# Patient Record
Sex: FEMALE | Race: WHITE | NOT HISPANIC OR LATINO | URBAN - METROPOLITAN AREA
[De-identification: names, ages, dates, MRNs, and addresses within clinical notes are randomized per-mention and may not be internally consistent; named-entity substitution may affect disease eponyms.]

---

## 2017-07-07 ENCOUNTER — OFFICE (AMBULATORY)
Dept: URBAN - METROPOLITAN AREA CLINIC 64 | Facility: CLINIC | Age: 57
End: 2017-07-07

## 2017-07-07 ENCOUNTER — ON CAMPUS - OUTPATIENT (AMBULATORY)
Dept: URBAN - METROPOLITAN AREA HOSPITAL 2 | Facility: HOSPITAL | Age: 57
End: 2017-07-07
Payer: COMMERCIAL

## 2017-07-07 VITALS
HEART RATE: 60 BPM | HEIGHT: 64 IN | SYSTOLIC BLOOD PRESSURE: 160 MMHG | SYSTOLIC BLOOD PRESSURE: 179 MMHG | HEART RATE: 64 BPM | SYSTOLIC BLOOD PRESSURE: 127 MMHG | DIASTOLIC BLOOD PRESSURE: 135 MMHG | OXYGEN SATURATION: 99 % | RESPIRATION RATE: 16 BRPM | TEMPERATURE: 97.1 F | DIASTOLIC BLOOD PRESSURE: 67 MMHG | SYSTOLIC BLOOD PRESSURE: 121 MMHG | OXYGEN SATURATION: 94 % | DIASTOLIC BLOOD PRESSURE: 61 MMHG | SYSTOLIC BLOOD PRESSURE: 163 MMHG | SYSTOLIC BLOOD PRESSURE: 141 MMHG | HEART RATE: 63 BPM | HEART RATE: 65 BPM | SYSTOLIC BLOOD PRESSURE: 107 MMHG | DIASTOLIC BLOOD PRESSURE: 73 MMHG | HEART RATE: 79 BPM | HEART RATE: 85 BPM | OXYGEN SATURATION: 100 % | SYSTOLIC BLOOD PRESSURE: 154 MMHG | WEIGHT: 185 LBS | DIASTOLIC BLOOD PRESSURE: 80 MMHG | RESPIRATION RATE: 18 BRPM | DIASTOLIC BLOOD PRESSURE: 64 MMHG | SYSTOLIC BLOOD PRESSURE: 140 MMHG | SYSTOLIC BLOOD PRESSURE: 131 MMHG | HEART RATE: 70 BPM | OXYGEN SATURATION: 95 % | HEART RATE: 72 BPM | DIASTOLIC BLOOD PRESSURE: 63 MMHG | OXYGEN SATURATION: 97 % | DIASTOLIC BLOOD PRESSURE: 92 MMHG | HEART RATE: 66 BPM | DIASTOLIC BLOOD PRESSURE: 101 MMHG | DIASTOLIC BLOOD PRESSURE: 86 MMHG

## 2017-07-07 DIAGNOSIS — D12.3 BENIGN NEOPLASM OF TRANSVERSE COLON: ICD-10-CM

## 2017-07-07 DIAGNOSIS — Z86.010 PERSONAL HISTORY OF COLONIC POLYPS: ICD-10-CM

## 2017-07-07 DIAGNOSIS — K21.9 GASTRO-ESOPHAGEAL REFLUX DISEASE WITHOUT ESOPHAGITIS: ICD-10-CM

## 2017-07-07 DIAGNOSIS — R13.10 DYSPHAGIA, UNSPECIFIED: ICD-10-CM

## 2017-07-07 DIAGNOSIS — K57.30 DIVERTICULOSIS OF LARGE INTESTINE WITHOUT PERFORATION OR ABS: ICD-10-CM

## 2017-07-07 LAB
GI HISTOLOGY: A. UNSPECIFIED: (no result)
GI HISTOLOGY: PDF REPORT: (no result)

## 2017-07-07 PROCEDURE — 88305 TISSUE EXAM BY PATHOLOGIST: CPT | Mod: 26 | Performed by: INTERNAL MEDICINE

## 2017-07-07 PROCEDURE — 43450 DILATE ESOPHAGUS 1/MULT PASS: CPT | Performed by: INTERNAL MEDICINE

## 2017-07-07 PROCEDURE — 45385 COLONOSCOPY W/LESION REMOVAL: CPT | Performed by: INTERNAL MEDICINE

## 2017-07-07 PROCEDURE — 43235 EGD DIAGNOSTIC BRUSH WASH: CPT | Performed by: INTERNAL MEDICINE

## 2017-07-07 RX ORDER — PANTOPRAZOLE SODIUM 40 MG/1
80 TABLET, DELAYED RELEASE ORAL
Qty: 60 | Refills: 11 | Status: COMPLETED
Start: 2017-07-07 | End: 2019-03-12

## 2017-07-07 RX ADMIN — PROPOFOL: 10 INJECTION, EMULSION INTRAVENOUS at 15:03

## 2017-08-22 ENCOUNTER — OFFICE (AMBULATORY)
Dept: URBAN - NONMETROPOLITAN AREA CLINIC 10 | Facility: CLINIC | Age: 57
End: 2017-08-22

## 2017-08-22 VITALS
HEIGHT: 64 IN | SYSTOLIC BLOOD PRESSURE: 142 MMHG | WEIGHT: 177 LBS | HEART RATE: 78 BPM | DIASTOLIC BLOOD PRESSURE: 84 MMHG

## 2017-08-22 DIAGNOSIS — R11.2 NAUSEA WITH VOMITING, UNSPECIFIED: ICD-10-CM

## 2017-08-22 DIAGNOSIS — R13.10 DYSPHAGIA, UNSPECIFIED: ICD-10-CM

## 2017-08-22 DIAGNOSIS — K21.9 GASTRO-ESOPHAGEAL REFLUX DISEASE WITHOUT ESOPHAGITIS: ICD-10-CM

## 2017-08-22 PROCEDURE — 99213 OFFICE O/P EST LOW 20 MIN: CPT | Performed by: INTERNAL MEDICINE

## 2017-10-25 ENCOUNTER — HOSPITAL ENCOUNTER (OUTPATIENT)
Dept: SLEEP MEDICINE | Facility: HOSPITAL | Age: 57
Discharge: HOME | End: 2017-10-25
Attending: INTERNAL MEDICINE | Admitting: INTERNAL MEDICINE

## 2017-12-28 ENCOUNTER — HOSPITAL ENCOUNTER (OUTPATIENT)
Dept: SLEEP MEDICINE | Facility: HOSPITAL | Age: 57
Discharge: HOME | End: 2017-12-28
Attending: INTERNAL MEDICINE | Admitting: INTERNAL MEDICINE

## 2018-04-24 ENCOUNTER — OFFICE (AMBULATORY)
Dept: URBAN - NONMETROPOLITAN AREA CLINIC 10 | Facility: CLINIC | Age: 58
End: 2018-04-24

## 2018-04-24 VITALS
DIASTOLIC BLOOD PRESSURE: 86 MMHG | HEART RATE: 73 BPM | HEIGHT: 64 IN | SYSTOLIC BLOOD PRESSURE: 150 MMHG | WEIGHT: 195 LBS

## 2018-04-24 DIAGNOSIS — K21.9 GASTRO-ESOPHAGEAL REFLUX DISEASE WITHOUT ESOPHAGITIS: ICD-10-CM

## 2018-04-24 DIAGNOSIS — R11.0 NAUSEA: ICD-10-CM

## 2018-04-24 DIAGNOSIS — R10.31 RIGHT LOWER QUADRANT PAIN: ICD-10-CM

## 2018-04-24 DIAGNOSIS — K62.5 HEMORRHAGE OF ANUS AND RECTUM: ICD-10-CM

## 2018-04-24 PROCEDURE — 99213 OFFICE O/P EST LOW 20 MIN: CPT | Performed by: INTERNAL MEDICINE

## 2018-04-24 RX ORDER — OMEPRAZOLE 40 MG/1
40 CAPSULE, DELAYED RELEASE ORAL
Qty: 90 | Refills: 4 | Status: COMPLETED
Start: 2018-04-24 | End: 2019-03-12

## 2019-03-12 ENCOUNTER — OFFICE (AMBULATORY)
Dept: URBAN - NONMETROPOLITAN AREA CLINIC 10 | Facility: CLINIC | Age: 59
End: 2019-03-12

## 2019-03-12 VITALS
HEIGHT: 64 IN | HEART RATE: 78 BPM | SYSTOLIC BLOOD PRESSURE: 140 MMHG | WEIGHT: 204 LBS | DIASTOLIC BLOOD PRESSURE: 80 MMHG

## 2019-03-12 DIAGNOSIS — K62.5 HEMORRHAGE OF ANUS AND RECTUM: ICD-10-CM

## 2019-03-12 PROCEDURE — 99214 OFFICE O/P EST MOD 30 MIN: CPT | Performed by: INTERNAL MEDICINE

## 2019-03-19 ENCOUNTER — HOSPITAL ENCOUNTER (OUTPATIENT)
Dept: FAMILY MEDICINE CLINIC | Facility: CLINIC | Age: 59
Setting detail: SPECIMEN
Discharge: HOME | End: 2019-03-19
Attending: FAMILY MEDICINE | Admitting: FAMILY MEDICINE

## 2019-03-19 LAB
BASOPHILS # BLD AUTO: 0 10*3/UL (ref 0–0.2)
BASOPHILS NFR BLD AUTO: 1 % (ref 0–2)
DIFFERENTIAL METHOD BLD: (no result)
EOSINOPHIL # BLD AUTO: 0.4 10*3/UL (ref 0–0.3)
EOSINOPHIL # BLD AUTO: 9 % (ref 0–3)
ERYTHROCYTE [DISTWIDTH] IN BLOOD BY AUTOMATED COUNT: 13.7 % (ref 11.5–14.5)
HCT VFR BLD AUTO: 38.8 % (ref 35–49)
HGB BLD-MCNC: 12.7 G/DL (ref 12–15)
LYMPHOCYTES # BLD AUTO: 1.5 10*3/UL (ref 0.8–4.8)
LYMPHOCYTES NFR BLD AUTO: 34 % (ref 18–42)
MCH RBC QN AUTO: 30 PG (ref 26–32)
MCHC RBC AUTO-ENTMCNC: 32.7 G/DL (ref 32–36)
MCV RBC AUTO: 91.7 FL (ref 80–94)
MONOCYTES # BLD AUTO: 0.3 10*3/UL (ref 0.1–1.3)
MONOCYTES NFR BLD AUTO: 8 % (ref 2–11)
NEUTROPHILS # BLD AUTO: 2.2 10*3/UL (ref 2.3–8.6)
NEUTROPHILS NFR BLD AUTO: 48 % (ref 50–75)
NRBC BLD AUTO-RTO: 0 /100{WBCS}
NRBC/RBC NFR BLD MANUAL: 0 10*3/UL
PLATELET # BLD AUTO: 259 10*3/UL (ref 150–450)
PMV BLD AUTO: 7.7 FL (ref 7.4–10.4)
RBC # BLD AUTO: 4.23 10*6/UL (ref 4–5.4)
WBC # BLD AUTO: 4.5 10*3/UL (ref 4.5–11.5)

## 2019-04-24 ENCOUNTER — CONVERSION ENCOUNTER (OUTPATIENT)
Dept: OTHER | Facility: HOSPITAL | Age: 59
End: 2019-04-24

## 2019-05-28 ENCOUNTER — CONVERSION ENCOUNTER (OUTPATIENT)
Dept: OTHER | Facility: HOSPITAL | Age: 59
End: 2019-05-28

## 2019-06-04 VITALS
WEIGHT: 219.6 LBS | HEIGHT: 55 IN | WEIGHT: 223.4 LBS | OXYGEN SATURATION: 95 % | DIASTOLIC BLOOD PRESSURE: 85 MMHG | DIASTOLIC BLOOD PRESSURE: 90 MMHG | SYSTOLIC BLOOD PRESSURE: 156 MMHG | HEIGHT: 55 IN | BODY MASS INDEX: 50.82 KG/M2 | OXYGEN SATURATION: 95 % | HEART RATE: 75 BPM | BODY MASS INDEX: 51.7 KG/M2 | SYSTOLIC BLOOD PRESSURE: 144 MMHG

## 2019-06-06 NOTE — PROGRESS NOTES
Visit Type:  Follow-up Visit  Referring Provider:  Rosy Martinez MD   Primary Provider:  Rosy Martinez MD     CC:  1 month follow up.    History of Present Illness:  Patient presents for a 1 month follow up.  she has multiple issues  Fu anxiety, doing well with current med    Hx DDD spine with spinal Stenosis, scoliosis. meningioma.  She has brought in previous imaging done by Dr. Robins([facility]) and Dr. Zhang ( spine) [facility]  She was being considered for a very complicated surgery to address these problems.  Imaging scanned and reviewed.  will request office notes from these providers She isn't sure that she is ready to go through surgery.  She is requesting hospital bed amd a new lift chair.  will get office notes from consultatnts  Hx +RF.  Pt has pending appointment with rheumatology  Hx Sarcoidodisis-she needs referral to pulmonorlogy.  she is requesting evaluation for nocturnal hypoxemia and has not yet heard from [facility]    She has taken alendronate for over 6 years and her latest Dexa shows normal bone density.  Alendronate will be Dc'd    She is follwed by urology for  bladder problems    She needs fu with GI for polyps, fissures, and GYN forp elvic prolapse          Vital Signs:    Patient Profile:    58 Years Old Female  Height:     53 inches (134.62 cm)  Weight:     223.4 pounds  BMI:        55.91     O2 Sat:     95 %  Temp:       100.4 degrees F oral  Pulse rate: 879 / minute  BP Sittin / 90  (left arm)    Cuff size:  regular      Problems: Active problems were reviewed with the patient during this visit.  Medications: Medications were reviewed with the patient during this visit.  Allergies: Allergies were reviewed with the patient during this visit.        Vitals Entered By: Olivia Fink [facility] (May 28, 2019 3:18 PM)    Active Medications (reviewed today):  VINCE-BEE/C ORAL TABLET (B COMPLEX-C-FOLIC ACID) 1 po qd  BUSPIRONE HCL 10 MG ORAL TABLET (BUSPIRONE HCL) 1  po BID PRN anxiety  FLONASE ALLERGY RELIEF 50 MCG/ACT NASAL SUSPENSION (FLUTICASONE PROPIONATE) 2 sprays once daily  CYMBALTA 60 MG ORAL CAPSULE DELAYED RELEASE PARTICLES (DULOXETINE HCL) 1 po every other day (OPPOSITE DAYS OF VIIBRYD)  AMITRIPTYLINE HCL 75 MG ORAL TABLET (AMITRIPTYLINE HCL) take 1 tablet at bedtime  LEXAPRO 20 MG ORAL TABLET (ESCITALOPRAM OXALATE) 1/2 tablet  CARAFATE 1 GM ORAL TABLET (SUCRALFATE) tale 1 tablet 3x a day  HYDROXYZ HCL TAB 25MG 25.000 TABLET (HYDROXYZINE HCL) TAKE ONE (1) TABLET BY MOUTH THREE TIMES DAILY  METOPROLOL SUCCINATE ER 50 MG ORAL TABLET EXTENDED RELEASE 24 HOUR (METOPROLOL SUCCINATE) 1 po qd  IPRATROPIUM-ALBUTEROL 0.5-2.5 (3) MG/3ML INHALATION SOLUTION (IPRATROPIUM-ALBUTEROL) 1 vial QID  PROMETHAZINE 25MG 25.000 TABLET (PROMETHAZINE HCL) TAKE 1 TABLET 2 TIMES A DAY ROUTINELY  DOXEPIN HCL  CAP 25MG CAPSULE (DOXEPIN HCL) TAKE 1 CAPSULE BY MOUTH AT BEDTIME  OXYCODONE-ACETAMINOPHEN  MG ORAL TABLET (OXYCODONE-ACETAMINOPHEN) 1 tab po five times daily routinely  BACLOFEN     TAB 20MG TABLET (BACLOFEN) TAKE 1 TABLET 4 TIMES A DAY ROUTINELY  TAMSULOSIN HCL 0.4 MG ORAL CAPSULE (TAMSULOSIN HCL) 1 po qd  MONTELUKAST SODIUM 10 MG ORAL TABLET (MONTELUKAST SODIUM) 1 po qd  GABAPENTIN 800 MG ORAL TABLET (GABAPENTIN) 1 tab po QID    Current Allergies (reviewed today):  NSAIDS (Critical)  AUGMENTIN (AMOXICILLIN-POT CLAVULANATE) (Critical)  * CLINDAMYCIN (Critical)  CODEINE (Critical)  CORTISONE (MORPHINE SULFATE) (Critical)  * DEPOMEDROL (Critical)  ERYTHROMYCIN (Critical)  FELDENE (PIROXICAM) (Critical)  * METHADONE (Critical)  * NORGESIC (Critical)  PCN (PENICILLIN V POTASSIUM) (Critical)  PERCODAN (Critical)  PREDNISONE (PREDNISONE) (Critical)  SULFA (Critical)  TAGAMET (CIMETIDINE) (Critical)  * VIBRA-TAB (Critical)  VICODIN (Critical)  * ZYRTEC (Critical)  ADHESIVE TAPE (Critical)      Past Medical History:     Migraines     Hypertension     Asthma     COPD, sarcpoidosis of  Lung     Borderline Diabetes      Kidney Stone     Pre-Cancerous Cells in Colon & Bladder , colon polyps     3 cysts in thryoid       ?? Rheumatoid Arthritis/sarcoidosis ( per old provider- Dr. Batista)     Brain Tumor-meningioma     Breast Tumors     Lumbar Spondylosis with myelopathy     Spinal stenosis of lumbar region, DDD-cervical, thjoracic, lumbar     elevated diaphragm     rotated spine, scoliosis     FRANKIE     Glaucoma      Gangrene      Rectocele     Cystocele     +RF     Colon polyp          Risk Factors:     Smoked Tobacco Use:  Never smoker  Smokeless Tobacco Use:  Never  Passive smoke exposure:  no  Drug use:  no  HIV high-risk behavior:  no  Caffeine use:  0 drinks per day  Alcohol use:  no  Exercise:  no  Seatbelt use:  100 %  Sun Exposure:  rarely    Dietary Counseling: yes    Previous Tobacco Use: Signed On - 04/03/2019  Smoked Tobacco Use:  Never smoker  Smokeless Tobacco Use:  Never  Passive smoke exposure:  no  Drug use:  no  HIV high-risk behavior:  no  Caffeine use:  0 drinks per day    Previous Alcohol Use: Signed On - 04/03/2019  Alcohol use:  no  Exercise:  no  Seatbelt use:  100 %  Sun Exposure:  rarely    Dietary Counseling: yes        Review of Systems        See HPI      Physical Exam    General:      obese.    Head:      normocephalic and atraumatic.    Eyes:      PERRL/EOM intact, conjunctiva and sclera clear with out nystagmus.    Ears:      TM's intact and clear with normal canals with grossly normal hearing.    Nose:      no deformity, discharge, inflammation, or lesions.    Mouth:      no deformity or lesions with good dentition.    Abdomen:       normal bowel sounds; no hepatosplenomegaly no ventral,umbilical hernias or masses noted.    Msk:      decreased ROM, scoliosis, kyphosis and unsteady gait.    Pulses:      pulses normal in all 4 extremities.    Extremities:      trace left pedal edema and trace right pedal edema.    Neurologic:      CN II-XII gross intact and weakness  noted:.      Diabetes Management Exam:      Foot Exam (with socks and/or shoes not present):        Pulses:           pulses normal in all 4 extremities.        Blood Pressure:  Today's BP: 144/90 mm Hg          Impression & Recommendations:    Problem # 1:  DDD, axial skeleton (ICD-722.6) (YRV82-R27.9)  Assessment: Unchanged    Problem # 2:  Brain Tumor (ICD-191.9) (QBM09-L43.9)  Assessment: Unchanged    Problem # 3:  Sarcoidosis, pulmonary (ICD-135) (MHA37-G45.0)  Assessment: Unchanged    Problem # 4:  Anxiety disorder (ICD-300.00) (IYE47-K94.9)  Assessment: Improved    Her updated medication list for this problem includes:     Buspirone Hcl 10 Mg Oral Tablet (Buspirone hcl) ..... 1 po bid prn anxiety     Cymbalta 60 Mg Oral Capsule Delayed Release Particles (Duloxetine hcl) ..... 1 po every other day (opposite days of viibryd)     Amitriptyline Hcl 75 Mg Oral Tablet (Amitriptyline hcl) ..... Take 1 tablet at bedtime     Lexapro 20 Mg Oral Tablet (Escitalopram oxalate) ..... 1 tablet daily     Hydroxyz Hcl Tab 25mg 25.000 Tablet (Hydroxyzine hcl) ..... Take one (1) tablet by mouth three times daily     Doxepin Hcl Cap 25mg Capsule (Doxepin hcl) ..... Take 1 capsule by mouth at bedtime      Problem # 5:  Scoliosis deformity of spine (ICD-737.30) (ZTC63-K08.9)  Assessment: Unchanged    Problem # 6:  Hypoxemia (ICD-799.02) (JDU32-F06.02)  Assessment: Unchanged    Problem # 7:  Functional gait abnormality (ICD-781.2) (BCV59-Q41.9)  Assessment: Unchanged    Problem # 8:  Pedal edema (ICD-782.3) (TYW02-Q44.0)    Medications Added to Medication List This Visit:  1)  Furosemide 20 Mg Oral Tablet (Furosemide) .... Take 1 tablet daily as needed for edema        Patient Instructions:  1)  fu [facility] referral for nocturnal oximetry  2)  refer to pulmonology for Sarcoidosis  3)  keep fu with dpine surgery, neurosurgery, rheumatology  4)  refer to [facility] for lift chair and hospital bed  5)  Rx lasix for edemaa  6)  refill meds  as needed  7)  keep fu of abnormal mammogram    ]      Electronically signed by Roys Martinez MD on 05/29/2019 at 11:40 AM  ________________________________________________________________________       Disclaimer: Converted Note message may not contain all data elements that existed in the legacy source system. Please see [employer] [facility] for the original note details.

## 2019-06-06 NOTE — PROGRESS NOTES
Visit Type:  New Problem  Referring Provider:  Rosy Martinez MD   Primary Provider:  Roys Martinez MD     CC:  left ear/antidepressant.    History of Present Illness:  Patient presents for a follow up visit.   Fu anxiety  She was switched to Viibryd which was too expensive and will be resuming lexapro aalternate with cymbalta   She reports decreased hearing in her left ear.    Patient has not heard anything from [facility] about her home oxygen yet..  she has increased soa at night  and needs nocturnal oximetry      Vital Signs:    Patient Profile:    58 Years Old Female  Height:     53 inches (134.62 cm)  Weight:     219.6 pounds  BMI:        54.96     O2 Sat:     95 %  Temp:       99.1 degrees F oral  Pulse rate: 75 / minute  BP Sittin / 85  (left arm)    Cuff size:  regular      Problems: Active problems were reviewed with the patient during this visit.  Medications: Medications were reviewed with the patient during this visit.  Allergies: Allergies were reviewed with the patient during this visit.        Vitals Entered By: Olivia Fink [facility] (2019 1:57 PM)    Active Medications (reviewed today):  AMITRIPTYLINE HCL 75 MG ORAL TABLET (AMITRIPTYLINE HCL) take 1 tablet at bedtime  LEXAPRO 20 MG ORAL TABLET (ESCITALOPRAM OXALATE) 1/2 tablet  CARAFATE 1 GM ORAL TABLET (SUCRALFATE) tale 1 tablet 3x a day  HYDROXYZ HCL TAB 25MG 25.000 TABLET (HYDROXYZINE HCL) TAKE ONE (1) TABLET BY MOUTH THREE TIMES DAILY  METOPROLOL SUCCINATE ER 50 MG ORAL TABLET EXTENDED RELEASE 24 HOUR (METOPROLOL SUCCINATE) 1 po qd  IPRATROPIUM-ALBUTEROL 0.5-2.5 (3) MG/3ML INHALATION SOLUTION (IPRATROPIUM-ALBUTEROL) 1 vial QID  PROMETHAZINE 25MG 25.000 TABLET (PROMETHAZINE HCL) TAKE 1 TABLET 2 TIMES A DAY ROUTINELY  DOXEPIN HCL  CAP 25MG CAPSULE (DOXEPIN HCL) TAKE 1 CAPSULE BY MOUTH AT BEDTIME  OXYCODONE-ACETAMINOPHEN  MG ORAL TABLET (OXYCODONE-ACETAMINOPHEN) 1 tab po five times daily routinely  BACLOFEN     TAB 20MG  TABLET (BACLOFEN) TAKE 1 TABLET 4 TIMES A DAY ROUTINELY  TAMSULOSIN HCL 0.4 MG ORAL CAPSULE (TAMSULOSIN HCL) 1 po qd  MONTELUKAST SODIUM 10 MG ORAL TABLET (MONTELUKAST SODIUM) 1 po qd  GABAPENTIN 800 MG ORAL TABLET (GABAPENTIN) 1 tab po QID    Current Allergies (reviewed today):  NSAIDS (Critical)  AUGMENTIN (AMOXICILLIN-POT CLAVULANATE) (Critical)  * CLINDAMYCIN (Critical)  CODEINE (Critical)  CORTISONE (MORPHINE SULFATE) (Critical)  * DEPOMEDROL (Critical)  ERYTHROMYCIN (Critical)  FELDENE (PIROXICAM) (Critical)  * METHADONE (Critical)  * NORGESIC (Critical)  PCN (PENICILLIN V POTASSIUM) (Critical)  PERCODAN (Critical)  PREDNISONE (PREDNISONE) (Critical)  SULFA (Critical)  TAGAMET (CIMETIDINE) (Critical)  * VIBRA-TAB (Critical)  VICODIN (Critical)  * ZYRTEC (Critical)  ADHESIVE TAPE (Critical)      Past Medical History:     Reviewed history from 04/03/2019 and no changes required:        Migraines        Hypertension        Asthma        COPD        Borderline Diabetes         Kidney Stone        Pre-Cancerous Cells in Colon & Bladder         3 cysts in thryoid          ?? Rheumatoid Arthritis/sarcoidosis ( per old provider- Dr. Batista)        Brain Tumor        Breast Tumors        Lumbar Spondylosis with myelopathy        Spinal stenosis of lumbar region        elevated diaphragm        rotated spine        Lumbar foraminal stenosis         Glaucoma         Gangrene         Rectocele        Cystocele    Past Surgical History:     Reviewed history from 02/06/2019 and no changes required:        Appendectomy        Cholecystectomy        Carpal Tunnel Release- (R)         Breast cysts removed         Partial Hysterectomy         Social History:     Reviewed history from 03/19/2019 and no changes required:        Passive Smoke: NAlcohol Use: N        Patient refuses blood transfusions.                Smoking History:        Patient has never smoked.        Risk Factors:     Smoked Tobacco Use:  Never  smoker  Smokeless Tobacco Use:  Never  Passive smoke exposure:  no  Drug use:  no  HIV high-risk behavior:  no  Caffeine use:  0 drinks per day  Alcohol use:  no  Exercise:  no  Seatbelt use:  100 %  Sun Exposure:  rarely    Dietary Counseling: yes    Previous Tobacco Use: Signed On - 04/03/2019  Smoked Tobacco Use:  Never smoker  Smokeless Tobacco Use:  Never  Passive smoke exposure:  no  Drug use:  no  HIV high-risk behavior:  no  Caffeine use:  0 drinks per day    Previous Alcohol Use: Signed On - 04/03/2019  Alcohol use:  no  Exercise:  no  Seatbelt use:  100 %  Sun Exposure:  rarely    Dietary Counseling: yes          Physical Exam    General:      obese.    Head:      normocephalic and atraumatic.    Eyes:      PERRL/EOM intact, conjunctiva and sclera clear with out nystagmus.    Ears:      L TM dull.    Nose:      no deformity, discharge, inflammation, or lesions.    Mouth:      no deformity or lesions with good dentition.    Neck:      no masses, thyromegaly, or abnormal cervical nodes.    Lungs:      decreased BS on L and decreased BS on R.    Heart:      non-displaced PMI, chest non-tender; regular rate and rhythm, S1, S2 without murmurs, rubs, or gallops  Msk:      decreased ROM, scoliosis, kyphosis and unsteady gait.    Neurologic:      weakness noted:.    Psych:      anxious.        Blood Pressure:  Today's BP: 156/85 mm Hg          Impression & Recommendations:    Problem # 1:  Anxiety disorder (ICD-300.00) (YTF07-V42.9)  Assessment: Unchanged    Her updated medication list for this problem includes:     Buspirone Hcl 10 Mg Oral Tablet (Buspirone hcl) ..... 1 po bid prn anxiety     Cymbalta 60 Mg Oral Capsule Delayed Release Particles (Duloxetine hcl) ..... 1 po every other day (opposite days of viibryd)     Amitriptyline Hcl 75 Mg Oral Tablet (Amitriptyline hcl) ..... Take 1 tablet at bedtime     Lexapro 20 Mg Oral Tablet (Escitalopram oxalate) ..... 1 tablet daily     Hydroxyz Hcl Tab 25mg 25.000  Tablet (Hydroxyzine hcl) ..... Take one (1) tablet by mouth three times daily     Doxepin Hcl Cap 25mg Capsule (Doxepin hcl) ..... Take 1 capsule by mouth at bedtime      Problem # 2:  Ear pain, left (ICD-388.70) (PTH60-K49.02)  Assessment: Unchanged    Problem # 3:  DDD, axial skeleton (ICD-722.6) (BKC31-B13.9)  Assessment: Unchanged    Problem # 4:  Functional gait abnormality (ICD-781.2) (GXU96-D36.9)  Assessment: Unchanged    Medications Added to Medication List This Visit:  1)  Herlinda-bee/c Oral Tablet (B complex-c-folic acid) .... 1 po qd  2)  Buspirone Hcl 10 Mg Oral Tablet (Buspirone hcl) .... 1 po bid prn anxiety  3)  Flonase Allergy Relief 50 Mcg/act Nasal Suspension (Fluticasone propionate) .... 2 sprays once daily  4)  Lexapro 20 Mg Oral Tablet (Escitalopram oxalate) .... 1 tablet daily    Medications:  HERLINDA-BEE/C ORAL TABLET (B COMPLEX-C-FOLIC ACID) 1 po qd  #90[Tablet] x 0      Entered by: Shaunna Bobo LPN      Authorized by:  Rosy Martinez MD      Electronically signed by:   Shaunna Bobo LPN on 05/24/2019      Method used:    Electronically to               [facility]* (retail)              [place] [address]              Ph: [phone]              Fax: [phone]      Note to Pharmacy: Route: ORAL;       RxID:   [id number]  PROMETHAZINE 25MG 25.000 TABLET (PROMETHAZINE HCL) TAKE 1 TABLET 2 TIMES A DAY ROUTINELY  #180[Tablet] x 0      Entered by: Shaunna Bobo LPN      Authorized by:  Rosy Martinez MD      Electronically signed by:   Shaunna Bobo LPN on 05/06/2019      Method used:    Electronically to               [facility]* (mail-order)              [address] [place]  [address]              Ph: [phone]              Fax: [phone]      RxID:   [id number]  IPRATROPIUM-ALBUTEROL 0.5-2.5 (3) MG/3ML INHALATION SOLUTION (IPRATROPIUM-ALBUTEROL) 1 vial QID  #360[Milliliter] x 0       Entered by: Shaunna Bobo LPN      Authorized by:  Rosy Martinez MD      Electronically signed by:   Shaunna Bobo LPN on 05/06/2019      Method used:    Electronically to               [facility]* (mail-order)              [address] [place]  [address]              Ph: [phone]              Fax: [phone]      Note to Pharmacy: Route: INHALATION;       RxID:   [id number]  BUSPIRONE HCL 10 MG ORAL TABLET (BUSPIRONE HCL) 1 po BID PRN anxiety  #30[Tablet] x 1      Entered by: Shaunna Bobo LPN      Authorized by:  Rosy Martinez MD      Electronically signed by:   Shaunna Bobo LPN on 05/06/2019      Method used:    Electronically to               [facility]* ([place]ail)              [place] [address]              Ph: [phone]              Fax: [phone]      Note to Pharmacy: Route: ORAL;       RxID:   [id number]  FLONASE ALLERGY RELIEF 50 MCG/ACT NASAL SUSPENSION (FLUTICASONE PROPIONATE) 2 sprays once daily  #1[Bottle] x 0      Entered by: Shaunna Bobo LPN      Authorized by:  Rosy Martinez MD      Electronically signed by:   Shaunna Bobo LPN on 05/06/2019      Method used:    Electronically to               [facility]* ([place]ail)              [place] [address]              Ph: [phone]              Fax: [phone]      Note to Pharmacy: Route: NASAL;       RxID:   [id number]  GABAPENTIN 800 MG ORAL TABLET (GABAPENTIN) 1 tab po QID  #360[Tablet] x 0      Entered by: Shaunna Bobo LPN      Authorized by:  Roys Martinez MD      Electronically signed by:   Shaunna Bobo LPN on 05/06/2019      Method used:    Electronically to               [facility]* (mail-order)              [address] [place]  [address]              Ph: [phone]              Fax: [phone]      Note to Pharmacy:  Route: ORAL;       RxID:   [id number]  BACLOFEN     TAB 20MG TABLET (BACLOFEN) TAKE 1 TABLET 4 TIMES A DAY ROUTINELY  #120[Tablet] x 0      Entered by: Shaunna Bobo LPN      Authorized by:  Rosy Martinez MD      Electronically signed by:   Shaunna Bobo LPN on 04/25/2019      Method used:    Electronically to               [facility]* (mail-order)              [address] [place]  [address]              Ph: [phone]              Fax: [phone]      RxID:   [id number]        Patient Instructions:  1)  refer to ENT  2)  get med records  3)  fu order for nocturnal oximetry  4)  keep fu spine  5)  taper off Viibryd and taper up lexaproto alternate with cymbalta     ]      Electronically signed by Rosy Martinez MD on 05/29/2019 at 10:58 AM  ________________________________________________________________________       Disclaimer: Converted Note message may not contain all data elements that existed in the legacy source system. Please see [facility] [facility] for the original note details.

## 2019-06-28 RX ORDER — HYDROXYZINE HYDROCHLORIDE 25 MG/1
TABLET, FILM COATED ORAL
Qty: 90 TABLET | Refills: 2 | Status: SHIPPED | OUTPATIENT
Start: 2019-06-28 | End: 2019-08-07 | Stop reason: SDUPTHER

## 2019-06-28 RX ORDER — HYDROXYZINE HYDROCHLORIDE 25 MG/1
1 TABLET, FILM COATED ORAL 3 TIMES DAILY
COMMUNITY
Start: 2019-03-11 | End: 2019-10-11 | Stop reason: SDUPTHER

## 2019-06-28 NOTE — TELEPHONE ENCOUNTER
Last visit:  6/5/19  Next visit: 8/27/19  Last labs: 3/19/19    Rx requested: hydroxyzine   [facility]armacy[facility]

## 2019-07-12 RX ORDER — DULOXETIN HYDROCHLORIDE 60 MG/1
CAPSULE, DELAYED RELEASE ORAL
Qty: 30 CAPSULE | Refills: 1 | Status: SHIPPED | OUTPATIENT
Start: 2019-07-12 | End: 2019-08-07 | Stop reason: SDUPTHER

## 2019-07-12 RX ORDER — BUSPIRONE HYDROCHLORIDE 10 MG/1
TABLET ORAL
Qty: 30 TABLET | Refills: 1 | Status: SHIPPED | OUTPATIENT
Start: 2019-07-12 | End: 2019-08-02 | Stop reason: SDUPTHER

## 2019-08-02 RX ORDER — BUSPIRONE HYDROCHLORIDE 10 MG/1
TABLET ORAL
Qty: 30 TABLET | Refills: 1 | Status: SHIPPED | OUTPATIENT
Start: 2019-08-02 | End: 2019-08-07 | Stop reason: SDUPTHER

## 2019-08-05 RX ORDER — AMITRIPTYLINE HYDROCHLORIDE 75 MG/1
TABLET, FILM COATED ORAL EVERY 24 HOURS
COMMUNITY
Start: 2019-04-03 | End: 2019-10-11 | Stop reason: SDUPTHER

## 2019-08-07 ENCOUNTER — OFFICE VISIT (OUTPATIENT)
Dept: FAMILY MEDICINE CLINIC | Facility: CLINIC | Age: 59
End: 2019-08-07

## 2019-08-07 VITALS
HEIGHT: 63 IN | OXYGEN SATURATION: 95 % | HEART RATE: 73 BPM | SYSTOLIC BLOOD PRESSURE: 141 MMHG | WEIGHT: 227 LBS | DIASTOLIC BLOOD PRESSURE: 82 MMHG | BODY MASS INDEX: 40.22 KG/M2 | TEMPERATURE: 97.8 F

## 2019-08-07 DIAGNOSIS — R10.11 RIGHT UPPER QUADRANT ABDOMINAL PAIN: Primary | ICD-10-CM

## 2019-08-07 DIAGNOSIS — R10.84 GENERALIZED ABDOMINAL PAIN: ICD-10-CM

## 2019-08-07 DIAGNOSIS — M96.1 FAILED BACK SYNDROME: ICD-10-CM

## 2019-08-07 DIAGNOSIS — R41.3 MEMORY DEFICIT: ICD-10-CM

## 2019-08-07 DIAGNOSIS — J02.9 SORE THROAT: ICD-10-CM

## 2019-08-07 DIAGNOSIS — M41.9 KYPHOSCOLIOSIS DEFORMITY OF SPINE: ICD-10-CM

## 2019-08-07 DIAGNOSIS — M25.50 ARTHRALGIA, UNSPECIFIED JOINT: ICD-10-CM

## 2019-08-07 DIAGNOSIS — M51.37 DDD (DEGENERATIVE DISC DISEASE), LUMBOSACRAL: ICD-10-CM

## 2019-08-07 DIAGNOSIS — R53.81 PHYSICAL DECONDITIONING: ICD-10-CM

## 2019-08-07 DIAGNOSIS — J01.10 ACUTE FRONTAL SINUSITIS, RECURRENCE NOT SPECIFIED: ICD-10-CM

## 2019-08-07 DIAGNOSIS — R76.8 RHEUMATOID FACTOR POSITIVE: ICD-10-CM

## 2019-08-07 PROCEDURE — 99214 OFFICE O/P EST MOD 30 MIN: CPT | Performed by: FAMILY MEDICINE

## 2019-08-07 RX ORDER — FEXOFENADINE HCL 180 MG/1
180 TABLET ORAL DAILY
COMMUNITY
End: 2019-11-07 | Stop reason: SDUPTHER

## 2019-08-07 RX ORDER — GABAPENTIN 800 MG/1
800 TABLET ORAL 3 TIMES DAILY
COMMUNITY
End: 2019-08-17 | Stop reason: SDUPTHER

## 2019-08-07 RX ORDER — BRIMONIDINE TARTRATE 0.15 %
1 DROPS OPHTHALMIC (EYE) 3 TIMES DAILY
COMMUNITY
End: 2020-12-07

## 2019-08-07 RX ORDER — PROMETHAZINE HYDROCHLORIDE 25 MG/1
TABLET ORAL
COMMUNITY
Start: 2019-03-11 | End: 2019-08-19 | Stop reason: SDUPTHER

## 2019-08-07 RX ORDER — DULOXETIN HYDROCHLORIDE 60 MG/1
60 CAPSULE, DELAYED RELEASE ORAL DAILY
Qty: 30 CAPSULE | Refills: 2 | Status: SHIPPED | OUTPATIENT
Start: 2019-08-07 | End: 2019-10-11 | Stop reason: SDUPTHER

## 2019-08-07 RX ORDER — CEPHALEXIN 500 MG/1
500 CAPSULE ORAL 2 TIMES DAILY
Qty: 14 CAPSULE | Refills: 0 | Status: SHIPPED | OUTPATIENT
Start: 2019-08-07 | End: 2019-11-07 | Stop reason: SDUPTHER

## 2019-08-07 RX ORDER — DESONIDE 0.5 MG/G
1 CREAM TOPICAL 2 TIMES DAILY PRN
COMMUNITY

## 2019-08-07 RX ORDER — POLYETHYLENE GLYCOL 3350 17 G/17G
17 POWDER, FOR SOLUTION ORAL DAILY
COMMUNITY

## 2019-08-07 RX ORDER — ALBUTEROL SULFATE 90 UG/1
2 AEROSOL, METERED RESPIRATORY (INHALATION) EVERY 6 HOURS PRN
COMMUNITY
End: 2021-09-27 | Stop reason: SDUPTHER

## 2019-08-07 RX ORDER — BUSPIRONE HYDROCHLORIDE 10 MG/1
10 TABLET ORAL 2 TIMES DAILY
COMMUNITY
Start: 2019-08-07 | End: 2019-08-19 | Stop reason: SDUPTHER

## 2019-08-07 RX ORDER — MOMETASONE FUROATE 50 UG/1
2 SPRAY, METERED NASAL DAILY
COMMUNITY

## 2019-08-07 RX ORDER — BACLOFEN 20 MG/1
1 TABLET ORAL 4 TIMES DAILY
COMMUNITY
Start: 2019-06-09 | End: 2019-08-17 | Stop reason: SDUPTHER

## 2019-08-07 RX ORDER — IPRATROPIUM BROMIDE AND ALBUTEROL SULFATE 2.5; .5 MG/3ML; MG/3ML
3 SOLUTION RESPIRATORY (INHALATION) 4 TIMES DAILY PRN
COMMUNITY
Start: 2019-01-05 | End: 2020-12-07 | Stop reason: SDUPTHER

## 2019-08-07 RX ORDER — MONTELUKAST SODIUM 10 MG/1
10 TABLET ORAL DAILY
COMMUNITY
End: 2019-08-17 | Stop reason: SDUPTHER

## 2019-08-07 RX ORDER — ESOMEPRAZOLE MAGNESIUM 40 MG/1
40 FOR SUSPENSION ORAL 2 TIMES DAILY
COMMUNITY
End: 2019-11-07 | Stop reason: SDUPTHER

## 2019-08-07 RX ORDER — PRENATAL VIT 27,CALC/IRON/FA 60 MG-1 MG
TABLET ORAL
COMMUNITY
End: 2019-10-11 | Stop reason: SDUPTHER

## 2019-08-07 RX ORDER — OXYCODONE AND ACETAMINOPHEN 10; 325 MG/1; MG/1
1 TABLET ORAL EVERY 6 HOURS PRN
COMMUNITY
Start: 2019-01-22 | End: 2021-05-18 | Stop reason: SDUPTHER

## 2019-08-07 RX ORDER — SUCRALFATE 1 G/1
1 TABLET ORAL 4 TIMES DAILY
COMMUNITY
Start: 2018-01-02 | End: 2019-11-13 | Stop reason: SDUPTHER

## 2019-08-07 RX ORDER — METOPROLOL SUCCINATE 50 MG/1
1 TABLET, EXTENDED RELEASE ORAL DAILY
COMMUNITY
Start: 2019-01-03 | End: 2019-08-17 | Stop reason: SDUPTHER

## 2019-08-07 RX ORDER — DOXEPIN HYDROCHLORIDE 25 MG/1
1 CAPSULE ORAL EVERY 24 HOURS
COMMUNITY
Start: 2019-03-11 | End: 2019-08-19 | Stop reason: SDUPTHER

## 2019-08-16 ENCOUNTER — TELEPHONE (OUTPATIENT)
Dept: FAMILY MEDICINE CLINIC | Facility: CLINIC | Age: 59
End: 2019-08-16

## 2019-08-16 NOTE — TELEPHONE ENCOUNTER
[facility]     Last visit: 6/5/19  Next visit:8/27/19  Last labs: 8/7/19    Rx requested:   Montelukast 10mg   Metoprolol 50mg   Gabapentin 800mg   Promethazine 25mg  Baclofen 20mg     Pharmacy: [facility] mail order [facility]

## 2019-08-17 RX ORDER — GABAPENTIN 800 MG/1
800 TABLET ORAL 3 TIMES DAILY
Qty: 90 TABLET | Refills: 3 | Status: SHIPPED | OUTPATIENT
Start: 2019-08-17 | End: 2019-08-19 | Stop reason: SDUPTHER

## 2019-08-17 RX ORDER — BACLOFEN 20 MG/1
20 TABLET ORAL 4 TIMES DAILY
Qty: 120 TABLET | Refills: 3 | Status: SHIPPED | OUTPATIENT
Start: 2019-08-17 | End: 2019-08-19 | Stop reason: SDUPTHER

## 2019-08-17 RX ORDER — METOPROLOL SUCCINATE 50 MG/1
50 TABLET, EXTENDED RELEASE ORAL DAILY
Qty: 30 TABLET | Refills: 3 | Status: SHIPPED | OUTPATIENT
Start: 2019-08-17 | End: 2019-08-19 | Stop reason: SDUPTHER

## 2019-08-17 RX ORDER — MONTELUKAST SODIUM 10 MG/1
10 TABLET ORAL DAILY
Qty: 30 TABLET | Refills: 3 | Status: SHIPPED | OUTPATIENT
Start: 2019-08-17 | End: 2019-08-19 | Stop reason: SDUPTHER

## 2019-08-19 RX ORDER — GABAPENTIN 800 MG/1
800 TABLET ORAL 3 TIMES DAILY
Qty: 90 TABLET | Refills: 3 | Status: SHIPPED | OUTPATIENT
Start: 2019-08-19 | End: 2019-08-21 | Stop reason: SDUPTHER

## 2019-08-19 RX ORDER — PROMETHAZINE HYDROCHLORIDE 25 MG/1
TABLET ORAL
Qty: 90 TABLET | Refills: 0 | Status: SHIPPED | OUTPATIENT
Start: 2019-08-19 | End: 2019-10-11 | Stop reason: SDUPTHER

## 2019-08-19 RX ORDER — METOPROLOL SUCCINATE 50 MG/1
50 TABLET, EXTENDED RELEASE ORAL DAILY
Qty: 30 TABLET | Refills: 3 | Status: SHIPPED | OUTPATIENT
Start: 2019-08-19 | End: 2019-08-21 | Stop reason: SDUPTHER

## 2019-08-19 RX ORDER — BUSPIRONE HYDROCHLORIDE 10 MG/1
10 TABLET ORAL 2 TIMES DAILY
Qty: 90 TABLET | Refills: 0 | Status: SHIPPED | OUTPATIENT
Start: 2019-08-19 | End: 2019-10-11 | Stop reason: SDUPTHER

## 2019-08-19 RX ORDER — BACLOFEN 20 MG/1
20 TABLET ORAL 4 TIMES DAILY
Qty: 120 TABLET | Refills: 3 | Status: SHIPPED | OUTPATIENT
Start: 2019-08-19 | End: 2019-08-21 | Stop reason: SDUPTHER

## 2019-08-19 RX ORDER — PROMETHAZINE HYDROCHLORIDE 25 MG/1
TABLET ORAL
Qty: 90 TABLET | Refills: 0 | Status: SHIPPED | OUTPATIENT
Start: 2019-08-19 | End: 2019-08-19 | Stop reason: SDUPTHER

## 2019-08-19 RX ORDER — MONTELUKAST SODIUM 10 MG/1
10 TABLET ORAL DAILY
Qty: 30 TABLET | Refills: 3 | Status: SHIPPED | OUTPATIENT
Start: 2019-08-19 | End: 2019-08-21 | Stop reason: SDUPTHER

## 2019-08-19 RX ORDER — DOXEPIN HYDROCHLORIDE 25 MG/1
25 CAPSULE ORAL EVERY 24 HOURS
Qty: 90 CAPSULE | Refills: 0 | Status: SHIPPED | OUTPATIENT
Start: 2019-08-19 | End: 2019-10-11 | Stop reason: SDUPTHER

## 2019-08-22 RX ORDER — BACLOFEN 20 MG/1
20 TABLET ORAL 4 TIMES DAILY
Qty: 120 TABLET | Refills: 3 | Status: SHIPPED | OUTPATIENT
Start: 2019-08-22 | End: 2019-10-11 | Stop reason: SDUPTHER

## 2019-08-22 RX ORDER — GABAPENTIN 800 MG/1
800 TABLET ORAL 3 TIMES DAILY
Qty: 90 TABLET | Refills: 3 | Status: SHIPPED | OUTPATIENT
Start: 2019-08-22 | End: 2019-09-03 | Stop reason: SDUPTHER

## 2019-08-22 RX ORDER — MONTELUKAST SODIUM 10 MG/1
10 TABLET ORAL DAILY
Qty: 30 TABLET | Refills: 3 | Status: SHIPPED | OUTPATIENT
Start: 2019-08-22 | End: 2019-10-11 | Stop reason: SDUPTHER

## 2019-08-22 RX ORDER — METOPROLOL SUCCINATE 50 MG/1
50 TABLET, EXTENDED RELEASE ORAL DAILY
Qty: 30 TABLET | Refills: 3 | Status: SHIPPED | OUTPATIENT
Start: 2019-08-22 | End: 2019-11-13 | Stop reason: SDUPTHER

## 2019-09-03 RX ORDER — GABAPENTIN 800 MG/1
800 TABLET ORAL 3 TIMES DAILY
Qty: 90 TABLET | Refills: 3 | Status: SHIPPED | OUTPATIENT
Start: 2019-09-03 | End: 2019-10-11 | Stop reason: SDUPTHER

## 2019-09-10 NOTE — PROGRESS NOTES
Subjective   An Harmon is a 59 y.o. female.     Chief Complaint   Patient presents with   • Flank Pain     right side pain worse x 1 week       History of Present Illness   Pt with abdominal pain right sided for the past week, no fever, no n/v/d  Pt with multiple complaints  C/o HA, recurrent  Sore throat, congest, muscle aches, back pain, joint pain  The following portions of the patient's history were reviewed and updated as appropriate: allergies, current medications, past family history, past medical history, past social history, past surgical history and problem list.    Past Medical History:   Diagnosis Date   • Allergic    • Anxiety    • Arthritis    • Asthma    • Depression    • GERD (gastroesophageal reflux disease)    • Hypertension    • Kyphosis deformity of spine    • Low back pain    • Obesity    • Osteopenia     osteoporosis   • Scoliosis deformity of spine        Past Surgical History:   Procedure Laterality Date   • APPENDECTOMY         Family History   Problem Relation Age of Onset   • Cancer Mother    • Diabetes Mother    • Heart disease Mother    • Cancer Father    • COPD Father    • Cancer Sister        Review of Systems   Constitutional: Negative for fatigue, unexpected weight gain and unexpected weight loss.   HENT: Positive for congestion, sinus pressure and sore throat.    Eyes: Negative for blurred vision and visual disturbance.   Respiratory: Negative for cough, shortness of breath and wheezing.    Cardiovascular: Negative for chest pain, palpitations and leg swelling.   Gastrointestinal: Positive for abdominal pain and GERD. Negative for constipation, diarrhea, nausea, vomiting and indigestion.   Musculoskeletal: Positive for arthralgias, back pain, gait problem and myalgias. Negative for joint swelling and neck pain.        Hx + RA test   Skin: Negative for rash, skin lesions and bruise.   Allergic/Immunologic: Negative for environmental allergies.   Neurological: Positive for  weakness, headache and memory problem. Negative for dizziness, tremors, seizures, syncope and light-headedness.   Psychiatric/Behavioral: Positive for decreased concentration. Negative for sleep disturbance, depressed mood and stress. The patient is not nervous/anxious.        Objective   Physical Exam   Constitutional: She is oriented to person, place, and time. She appears well-developed and well-nourished. No distress.   HENT:   Head: Normocephalic and atraumatic.   Right Ear: External ear normal.   Left Ear: External ear normal.   Nose: Nose normal.   Mouth/Throat: Oropharynx is clear and moist.   Eyes: EOM are normal. Pupils are equal, round, and reactive to light.   Neck: Normal range of motion. Neck supple. No thyromegaly present.   Cardiovascular: Normal rate, regular rhythm and normal heart sounds. Exam reveals no gallop and no friction rub.   No murmur heard.  Pulmonary/Chest: Effort normal. She has no wheezes.   Abdominal: Soft. Bowel sounds are normal. She exhibits no distension and no mass. There is tenderness. There is no rebound and no guarding.   Musculoskeletal: Normal range of motion. She exhibits no edema, tenderness or deformity.   Limited rom due to severe kyphoscoliosis  Walking with cane with bent postrue  Antalgic gait   Lymphadenopathy:     She has no cervical adenopathy.   Neurological: She is alert and oriented to person, place, and time. No cranial nerve deficit.   Skin: Skin is warm and dry. No rash noted. She is not diaphoretic.   Psychiatric: She has a normal mood and affect. Her behavior is normal. Judgment and thought content normal.   Nursing note and vitals reviewed.        Assessment/Plan   An was seen today for flank pain.    Diagnoses and all orders for this visit:    Right upper quadrant abdominal pain    Generalized abdominal pain  -     US Abdomen Complete; Future  -     CBC & Differential; Future    Memory deficit  -     CT Head Without Contrast; Future  -     CBC &  Differential; Future  -     Comprehensive Metabolic Panel; Future  -     Vitamin B12; Future  -     TSH; Future  -     T4, free; Future    Failed back syndrome    Sore throat    Physical deconditioning    Arthralgia, unspecified joint    Rheumatoid factor positive    DDD (degenerative disc disease), lumbosacral    Acute frontal sinusitis, recurrence not specified    Other orders  -     DULoxetine (CYMBALTA) 60 MG capsule; Take 1 capsule by mouth Daily.  -     cephalexin (KEFLEX) 500 MG capsule; Take 1 capsule by mouth 2 (Two) Times a Day.        Keep appt with pulmonology and rheumatology  Continue duloxetine and buspar for anxiety   keep fu with pain management  Keep appt with GI for rectal bleeding  Schedule abdominal US for abdominal pain           Vitals:    08/07/19 1306   BP: 141/82   Pulse: 73   Temp: 97.8 °F (36.6 °C)   SpO2: 95%       No results found for: HGBA1C, POCGLU, LDL

## 2019-10-11 RX ORDER — DOXEPIN HYDROCHLORIDE 25 MG/1
25 CAPSULE ORAL EVERY 24 HOURS
Qty: 90 CAPSULE | Refills: 0 | Status: SHIPPED | OUTPATIENT
Start: 2019-10-11 | End: 2020-04-07 | Stop reason: SDUPTHER

## 2019-10-11 RX ORDER — AMITRIPTYLINE HYDROCHLORIDE 75 MG/1
75 TABLET, FILM COATED ORAL EVERY 24 HOURS
Qty: 90 TABLET | Refills: 1 | Status: SHIPPED | OUTPATIENT
Start: 2019-10-11 | End: 2020-03-17 | Stop reason: SDUPTHER

## 2019-10-11 RX ORDER — MONTELUKAST SODIUM 10 MG/1
10 TABLET ORAL DAILY
Qty: 30 TABLET | Refills: 3 | Status: SHIPPED | OUTPATIENT
Start: 2019-10-11 | End: 2019-12-27

## 2019-10-11 RX ORDER — HYDROXYZINE HYDROCHLORIDE 25 MG/1
25 TABLET, FILM COATED ORAL 3 TIMES DAILY
Qty: 180 TABLET | Refills: 1 | Status: SHIPPED | OUTPATIENT
Start: 2019-10-11 | End: 2020-01-29 | Stop reason: SDUPTHER

## 2019-10-11 RX ORDER — DULOXETIN HYDROCHLORIDE 60 MG/1
60 CAPSULE, DELAYED RELEASE ORAL DAILY
Qty: 30 CAPSULE | Refills: 2 | Status: SHIPPED | OUTPATIENT
Start: 2019-10-11 | End: 2019-12-27

## 2019-10-11 RX ORDER — BACLOFEN 20 MG/1
20 TABLET ORAL 4 TIMES DAILY
Qty: 120 TABLET | Refills: 3 | Status: SHIPPED | OUTPATIENT
Start: 2019-10-11 | End: 2020-03-19 | Stop reason: SDUPTHER

## 2019-10-11 RX ORDER — PROMETHAZINE HYDROCHLORIDE 25 MG/1
TABLET ORAL
Qty: 90 TABLET | Refills: 0 | Status: SHIPPED | OUTPATIENT
Start: 2019-10-11 | End: 2019-12-27

## 2019-10-11 RX ORDER — PRENATAL VIT 27,CALC/IRON/FA 60 MG-1 MG
1 TABLET ORAL DAILY
Qty: 90 TABLET | Refills: 1 | Status: SHIPPED | OUTPATIENT
Start: 2019-10-11 | End: 2020-03-17 | Stop reason: SDUPTHER

## 2019-10-11 RX ORDER — GABAPENTIN 800 MG/1
800 TABLET ORAL 3 TIMES DAILY
Qty: 90 TABLET | Refills: 3 | Status: SHIPPED | OUTPATIENT
Start: 2019-10-11 | End: 2020-01-29 | Stop reason: SDUPTHER

## 2019-10-11 RX ORDER — BUSPIRONE HYDROCHLORIDE 10 MG/1
10 TABLET ORAL 2 TIMES DAILY
Qty: 90 TABLET | Refills: 0 | Status: SHIPPED | OUTPATIENT
Start: 2019-10-11 | End: 2019-12-27

## 2019-10-29 RX ORDER — BUSPIRONE HYDROCHLORIDE 10 MG/1
10 TABLET ORAL 2 TIMES DAILY
Qty: 90 TABLET | Refills: 0 | OUTPATIENT
Start: 2019-10-29

## 2019-10-29 RX ORDER — BUSPIRONE HYDROCHLORIDE 10 MG/1
10 TABLET ORAL 2 TIMES DAILY
Qty: 90 TABLET | Refills: 0 | Status: SHIPPED | OUTPATIENT
Start: 2019-10-29 | End: 2019-10-30 | Stop reason: SDUPTHER

## 2019-10-30 RX ORDER — BUSPIRONE HYDROCHLORIDE 10 MG/1
10 TABLET ORAL 2 TIMES DAILY
Qty: 60 TABLET | Refills: 0 | Status: SHIPPED | OUTPATIENT
Start: 2019-10-30 | End: 2019-11-07 | Stop reason: SDUPTHER

## 2019-10-31 RX ORDER — DULOXETIN HYDROCHLORIDE 60 MG/1
60 CAPSULE, DELAYED RELEASE ORAL DAILY
Qty: 30 CAPSULE | Refills: 2 | Status: SHIPPED | OUTPATIENT
Start: 2019-10-31 | End: 2019-11-07 | Stop reason: SDUPTHER

## 2019-11-07 ENCOUNTER — OFFICE VISIT (OUTPATIENT)
Dept: FAMILY MEDICINE CLINIC | Facility: CLINIC | Age: 59
End: 2019-11-07

## 2019-11-07 VITALS
WEIGHT: 229 LBS | OXYGEN SATURATION: 95 % | BODY MASS INDEX: 40.57 KG/M2 | TEMPERATURE: 99.8 F | HEIGHT: 63 IN | SYSTOLIC BLOOD PRESSURE: 136 MMHG | DIASTOLIC BLOOD PRESSURE: 88 MMHG | HEART RATE: 77 BPM

## 2019-11-07 DIAGNOSIS — R10.9 FLANK PAIN, ACUTE: Primary | ICD-10-CM

## 2019-11-07 DIAGNOSIS — R06.00 DYSPNEA AND RESPIRATORY ABNORMALITIES: ICD-10-CM

## 2019-11-07 DIAGNOSIS — R10.84 GENERALIZED ABDOMINAL PAIN: ICD-10-CM

## 2019-11-07 DIAGNOSIS — R06.89 DYSPNEA AND RESPIRATORY ABNORMALITIES: ICD-10-CM

## 2019-11-07 LAB
BILIRUB BLD-MCNC: NEGATIVE MG/DL
CLARITY, POC: CLEAR
COLOR UR: YELLOW
GLUCOSE UR STRIP-MCNC: NEGATIVE MG/DL
KETONES UR QL: NEGATIVE
LEUKOCYTE EST, POC: ABNORMAL
NITRITE UR-MCNC: NEGATIVE MG/ML
PH UR: 7 [PH] (ref 5–8)
PROT UR STRIP-MCNC: NEGATIVE MG/DL
RBC # UR STRIP: NEGATIVE /UL
SP GR UR: 1.01 (ref 1–1.03)
UROBILINOGEN UR QL: NORMAL

## 2019-11-07 PROCEDURE — 87086 URINE CULTURE/COLONY COUNT: CPT | Performed by: NURSE PRACTITIONER

## 2019-11-07 PROCEDURE — 81003 URINALYSIS AUTO W/O SCOPE: CPT | Performed by: NURSE PRACTITIONER

## 2019-11-07 PROCEDURE — 99213 OFFICE O/P EST LOW 20 MIN: CPT | Performed by: NURSE PRACTITIONER

## 2019-11-07 RX ORDER — POLYETHYLENE GLYCOL 8000 100 %
WAX (GRAM) MISCELLANEOUS
COMMUNITY
End: 2020-12-07 | Stop reason: SDUPTHER

## 2019-11-07 RX ORDER — FEXOFENADINE HYDROCHLORIDE 60 MG/1
60 TABLET, FILM COATED ORAL DAILY
COMMUNITY
End: 2021-09-27 | Stop reason: SDUPTHER

## 2019-11-09 LAB — BACTERIA SPEC AEROBE CULT: NO GROWTH

## 2019-11-11 ENCOUNTER — TELEPHONE (OUTPATIENT)
Dept: FAMILY MEDICINE CLINIC | Facility: CLINIC | Age: 59
End: 2019-11-11

## 2019-11-12 NOTE — TELEPHONE ENCOUNTER
Please contact patient and ask to what antibiotics she has taken in the past she has an extensive allergy list and I'm finding it difficult to prescribe one. What are here reactions to Doxy, Augmentin and Erythromycin

## 2019-11-13 DIAGNOSIS — E66.01 MORBIDLY OBESE (HCC): ICD-10-CM

## 2019-11-13 RX ORDER — SUCRALFATE 1 G/1
TABLET ORAL
Qty: 270 TABLET | Refills: 0 | Status: SHIPPED | OUTPATIENT
Start: 2019-11-13 | End: 2020-03-13 | Stop reason: SDUPTHER

## 2019-11-13 RX ORDER — METOPROLOL SUCCINATE 50 MG/1
TABLET, EXTENDED RELEASE ORAL
Qty: 90 TABLET | Refills: 0 | Status: SHIPPED | OUTPATIENT
Start: 2019-11-13 | End: 2020-03-04

## 2019-11-13 RX ORDER — CEPHALEXIN 500 MG/1
500 CAPSULE ORAL 2 TIMES DAILY
Qty: 14 CAPSULE | Refills: 0 | Status: SHIPPED | OUTPATIENT
Start: 2019-11-13 | End: 2019-11-20

## 2019-11-21 ENCOUNTER — TELEPHONE (OUTPATIENT)
Dept: FAMILY MEDICINE CLINIC | Facility: CLINIC | Age: 59
End: 2019-11-21
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
## 2019-11-21 DIAGNOSIS — S09.90XA INJURY OF HEAD, INITIAL ENCOUNTER: Primary | ICD-10-CM

## 2019-11-21 NOTE — TELEPHONE ENCOUNTER
PATIENT CAME IN AND SAID SHE WAS ON HER WAY TO [facility] TO GET HER CT BUT SHE HAS FELL A COUPLE OF TIMES AT HOME AND HAS A PLACE ON HER HEAD. WANTS TO KNOW IF WE CAN SENT AN ORDER TO GET A HEAD XRAY DOWN THERE SO SHE CAN HAVE THAT DONE AT THE SAME TIME?

## 2019-12-10 ENCOUNTER — OFFICE (AMBULATORY)
Dept: URBAN - NONMETROPOLITAN AREA CLINIC 10 | Facility: CLINIC | Age: 59
End: 2019-12-10

## 2019-12-10 VITALS
HEIGHT: 64 IN | SYSTOLIC BLOOD PRESSURE: 148 MMHG | WEIGHT: 205 LBS | DIASTOLIC BLOOD PRESSURE: 88 MMHG | HEART RATE: 74 BPM

## 2019-12-10 DIAGNOSIS — R10.11 RIGHT UPPER QUADRANT PAIN: ICD-10-CM

## 2019-12-10 DIAGNOSIS — R13.10 DYSPHAGIA, UNSPECIFIED: ICD-10-CM

## 2019-12-10 DIAGNOSIS — K62.5 HEMORRHAGE OF ANUS AND RECTUM: ICD-10-CM

## 2019-12-10 PROCEDURE — 99214 OFFICE O/P EST MOD 30 MIN: CPT | Performed by: INTERNAL MEDICINE

## 2019-12-10 RX ORDER — PEPPERMINT OIL 90 MG
180 CAPSULE, DELAYED, AND EXTENDED RELEASE ORAL
Qty: 48 | Refills: 1 | Status: ACTIVE
Start: 2019-12-10

## 2019-12-27 RX ORDER — MONTELUKAST SODIUM 10 MG/1
TABLET ORAL
Qty: 90 TABLET | Refills: 0 | Status: SHIPPED | OUTPATIENT
Start: 2019-12-27 | End: 2020-03-17 | Stop reason: SDUPTHER

## 2019-12-27 RX ORDER — DULOXETIN HYDROCHLORIDE 60 MG/1
CAPSULE, DELAYED RELEASE ORAL
Qty: 90 CAPSULE | Refills: 0 | Status: SHIPPED | OUTPATIENT
Start: 2019-12-27 | End: 2020-03-13 | Stop reason: SDUPTHER

## 2019-12-27 RX ORDER — PROMETHAZINE HYDROCHLORIDE 25 MG/1
TABLET ORAL
Qty: 90 TABLET | Refills: 0 | Status: SHIPPED | OUTPATIENT
Start: 2019-12-27 | End: 2020-01-07

## 2019-12-27 RX ORDER — BUSPIRONE HYDROCHLORIDE 10 MG/1
TABLET ORAL
Qty: 90 TABLET | Refills: 0 | Status: SHIPPED | OUTPATIENT
Start: 2019-12-27 | End: 2020-01-29 | Stop reason: SDUPTHER

## 2020-01-06 ENCOUNTER — TELEPHONE (OUTPATIENT)
Dept: FAMILY MEDICINE CLINIC | Facility: CLINIC | Age: 60
End: 2020-01-06

## 2020-01-06 NOTE — TELEPHONE ENCOUNTER
PT called & states her gabapentin needs to be 4 time a day and not 3. I do not see a record of her ever taking this medication 4 time a day. plz advise

## 2020-01-06 NOTE — TELEPHONE ENCOUNTER
Its on her medication list as three times daily its there last fill is October. Please remember to uncheck that box that we have discussed previously   Please ask patient when this was increased?

## 2020-01-07 RX ORDER — PROMETHAZINE HYDROCHLORIDE 25 MG/1
TABLET ORAL
Qty: 90 TABLET | Refills: 0 | Status: SHIPPED | OUTPATIENT
Start: 2020-01-07 | End: 2020-04-17

## 2020-01-29 ENCOUNTER — OFFICE VISIT (OUTPATIENT)
Dept: FAMILY MEDICINE CLINIC | Facility: CLINIC | Age: 60
End: 2020-01-29

## 2020-01-29 VITALS
BODY MASS INDEX: 40.75 KG/M2 | HEART RATE: 101 BPM | TEMPERATURE: 98.2 F | DIASTOLIC BLOOD PRESSURE: 86 MMHG | RESPIRATION RATE: 17 BRPM | SYSTOLIC BLOOD PRESSURE: 189 MMHG | HEIGHT: 63 IN | OXYGEN SATURATION: 92 % | WEIGHT: 230 LBS

## 2020-01-29 DIAGNOSIS — F41.9 ANXIETY: Primary | ICD-10-CM

## 2020-01-29 DIAGNOSIS — L98.9 LESION OF SKIN OF SCALP: ICD-10-CM

## 2020-01-29 PROCEDURE — 99213 OFFICE O/P EST LOW 20 MIN: CPT | Performed by: NURSE PRACTITIONER

## 2020-01-29 RX ORDER — BUSPIRONE HYDROCHLORIDE 10 MG/1
10 TABLET ORAL 3 TIMES DAILY
Qty: 90 TABLET | Refills: 0 | Status: SHIPPED | OUTPATIENT
Start: 2020-01-29 | End: 2020-03-06 | Stop reason: SDUPTHER

## 2020-01-29 RX ORDER — GABAPENTIN 800 MG/1
800 TABLET ORAL 4 TIMES DAILY
Qty: 120 TABLET | Refills: 1 | Status: SHIPPED | OUTPATIENT
Start: 2020-01-29 | End: 2020-03-13 | Stop reason: SDUPTHER

## 2020-01-29 RX ORDER — HYDROXYZINE HYDROCHLORIDE 25 MG/1
25 TABLET, FILM COATED ORAL 2 TIMES DAILY PRN
Qty: 180 TABLET | Refills: 1 | COMMUNITY
Start: 2020-01-29 | End: 2020-04-07 | Stop reason: SDUPTHER

## 2020-02-04 ENCOUNTER — TELEPHONE (OUTPATIENT)
Dept: FAMILY MEDICINE CLINIC | Facility: CLINIC | Age: 60
End: 2020-02-04

## 2020-02-04 NOTE — TELEPHONE ENCOUNTER
Patient is stating that you had changed her medication last week, she does not recall the names. She is asking if she is to take both or just take the new one.

## 2020-02-06 NOTE — TELEPHONE ENCOUNTER
I  spoke with the patient and she understood the directions, she also stated that the dermatology appt is no longer needed

## 2020-02-06 NOTE — TELEPHONE ENCOUNTER
Anxiety increase this medications to three times a day   -     busPIRone (BUSPAR) 10 MG tablet; Take 1 tablet by mouth 3 (Three) Times a Day.     Decrease this medications to twice daily  -     hydrOXYzine (ATARAX) 25 MG tablet; Take 1 tablet by mouth 2 (Two) Times a Day As Needed for Allergies or Anxiety.

## 2020-03-04 RX ORDER — METOPROLOL SUCCINATE 50 MG/1
TABLET, EXTENDED RELEASE ORAL
Qty: 90 TABLET | Refills: 0 | Status: SHIPPED | OUTPATIENT
Start: 2020-03-04 | End: 2020-04-07 | Stop reason: SDUPTHER

## 2020-03-06 DIAGNOSIS — F41.9 ANXIETY: ICD-10-CM

## 2020-03-06 RX ORDER — BUSPIRONE HYDROCHLORIDE 10 MG/1
10 TABLET ORAL 3 TIMES DAILY
Qty: 90 TABLET | Refills: 0 | Status: SHIPPED | OUTPATIENT
Start: 2020-03-06 | End: 2020-06-02

## 2020-03-09 ENCOUNTER — DOCUMENTATION (OUTPATIENT)
Dept: FAMILY MEDICINE CLINIC | Facility: CLINIC | Age: 60
End: 2020-03-09

## 2020-03-12 ENCOUNTER — TELEPHONE (OUTPATIENT)
Dept: FAMILY MEDICINE CLINIC | Facility: CLINIC | Age: 60
End: 2020-03-12

## 2020-03-12 RX ORDER — OMEPRAZOLE 40 MG/1
40 CAPSULE, DELAYED RELEASE ORAL DAILY
Qty: 90 CAPSULE | Refills: 0 | Status: CANCELLED | OUTPATIENT
Start: 2020-03-12

## 2020-03-13 RX ORDER — GABAPENTIN 800 MG/1
800 TABLET ORAL 4 TIMES DAILY
Qty: 120 TABLET | Refills: 1 | Status: SHIPPED | OUTPATIENT
Start: 2020-03-13 | End: 2020-03-17 | Stop reason: SDUPTHER

## 2020-03-13 RX ORDER — DULOXETIN HYDROCHLORIDE 60 MG/1
60 CAPSULE, DELAYED RELEASE ORAL DAILY
Qty: 90 CAPSULE | Refills: 0 | Status: SHIPPED | OUTPATIENT
Start: 2020-03-13 | End: 2020-03-17 | Stop reason: SDUPTHER

## 2020-03-13 RX ORDER — SUCRALFATE 1 G/1
1 TABLET ORAL 3 TIMES DAILY
Qty: 270 TABLET | Refills: 0 | Status: SHIPPED | OUTPATIENT
Start: 2020-03-13 | End: 2020-03-17 | Stop reason: SDUPTHER

## 2020-03-17 RX ORDER — PRENATAL VIT 27,CALC/IRON/FA 60 MG-1 MG
1 TABLET ORAL DAILY
Qty: 90 TABLET | Refills: 1 | Status: SHIPPED | OUTPATIENT
Start: 2020-03-17 | End: 2020-12-14 | Stop reason: SDUPTHER

## 2020-03-17 RX ORDER — DULOXETIN HYDROCHLORIDE 60 MG/1
60 CAPSULE, DELAYED RELEASE ORAL DAILY
Qty: 90 CAPSULE | Refills: 0 | Status: SHIPPED | OUTPATIENT
Start: 2020-03-17 | End: 2020-07-13 | Stop reason: SDUPTHER

## 2020-03-17 RX ORDER — MONTELUKAST SODIUM 10 MG/1
10 TABLET ORAL DAILY
Qty: 90 TABLET | Refills: 0 | Status: SHIPPED | OUTPATIENT
Start: 2020-03-17 | End: 2020-04-07 | Stop reason: SDUPTHER

## 2020-03-17 RX ORDER — GABAPENTIN 800 MG/1
800 TABLET ORAL 4 TIMES DAILY
Qty: 120 TABLET | Refills: 1 | Status: SHIPPED | OUTPATIENT
Start: 2020-03-17 | End: 2020-07-24

## 2020-03-17 RX ORDER — NYSTATIN 100000 [USP'U]/G
POWDER TOPICAL 3 TIMES DAILY
Qty: 30 G | Refills: 0 | Status: SHIPPED | OUTPATIENT
Start: 2020-03-17 | End: 2020-06-29 | Stop reason: SDUPTHER

## 2020-03-17 RX ORDER — SUCRALFATE 1 G/1
1 TABLET ORAL 3 TIMES DAILY
Qty: 270 TABLET | Refills: 0 | Status: SHIPPED | OUTPATIENT
Start: 2020-03-17 | End: 2021-01-26 | Stop reason: SDUPTHER

## 2020-03-17 RX ORDER — CEPHALEXIN 500 MG/1
500 CAPSULE ORAL 2 TIMES DAILY
Qty: 14 CAPSULE | Refills: 0 | Status: SHIPPED | OUTPATIENT
Start: 2020-03-17 | End: 2020-03-24

## 2020-03-17 RX ORDER — AMITRIPTYLINE HYDROCHLORIDE 75 MG/1
75 TABLET, FILM COATED ORAL EVERY 24 HOURS
Qty: 90 TABLET | Refills: 1 | Status: SHIPPED | OUTPATIENT
Start: 2020-03-17 | End: 2020-07-13 | Stop reason: SDUPTHER

## 2020-03-17 NOTE — TELEPHONE ENCOUNTER
NAM LEFT MESSAGE WITH ANSWERING SERVICE THAT SHE THINKS SHE HAS A BLADDER INFECTION PAIN IN ABDOMEN

## 2020-03-19 RX ORDER — BACLOFEN 20 MG/1
20 TABLET ORAL 4 TIMES DAILY
Qty: 120 TABLET | Refills: 3 | Status: SHIPPED | OUTPATIENT
Start: 2020-03-19 | End: 2020-07-13 | Stop reason: SDUPTHER

## 2020-03-23 ENCOUNTER — TELEPHONE (OUTPATIENT)
Dept: FAMILY MEDICINE CLINIC | Facility: CLINIC | Age: 60
End: 2020-03-23

## 2020-04-01 ENCOUNTER — OFFICE VISIT (OUTPATIENT)
Dept: FAMILY MEDICINE CLINIC | Facility: CLINIC | Age: 60
End: 2020-04-01

## 2020-04-01 DIAGNOSIS — J06.9 VIRAL UPPER RESPIRATORY TRACT INFECTION: Primary | ICD-10-CM

## 2020-04-01 PROCEDURE — 99441 PR PHYS/QHP TELEPHONE EVALUATION 5-10 MIN: CPT | Performed by: NURSE PRACTITIONER

## 2020-04-01 NOTE — PROGRESS NOTES
Chief Complaint   Patient presents with   • URI        Subjective   An Harmon is a 59 y.o.  female who presents today for   URI    This is a new problem. The current episode started yesterday. The problem has been gradually worsening. There has been no fever. Associated symptoms include headaches, nausea, rhinorrhea and sinus pain. She has tried nothing for the symptoms. The treatment provided no relief.     An Harmon  has a past medical history of Allergic, Anxiety, Arthritis, Asthma, Depression, GERD (gastroesophageal reflux disease), Hypertension, Kyphosis deformity of spine, Low back pain, Obesity, Osteopenia, and Scoliosis deformity of spine.    Allergies   Allergen Reactions   • Adhesive Tape Rash     tapes     • Amoxicillin-Pot Clavulanate Rash   • Cetirizine Palpitations   • Cimetidine Palpitations   • Clindamycin Rash   • Codeine GI Intolerance   • Doxycycline Rash   • Erythromycin Rash   • Hydrocodone-Acetaminophen GI Intolerance   • Methadone Palpitations   • Norgesic Forte  [Orphenadrine-Aspirin-Caffeine] Rash   • Nsaids GI Intolerance     Severe reaction     • Piroxicam Rash   • Prednisone Palpitations   • Aspirin GI Intolerance   • Hydrocodone Bitartrate Er GI Intolerance   • Ibuprofen GI Intolerance   • Oxycodone-Aspirin GI Intolerance   • Caffeine Palpitations   • Clavulanic Acid Rash   • Methylprednisolone Palpitations   • Penicillins Rash   • Sulfamethoxazole-Trimethoprim Rash   • Terfenadine Rash       Current Outpatient Medications:   •  albuterol sulfate  (90 Base) MCG/ACT inhaler, Inhale 2 puffs., Disp: , Rfl:   •  amitriptyline (ELAVIL) 75 MG tablet, Take 1 tablet by mouth Daily., Disp: 90 tablet, Rfl: 1  •  baclofen (LIORESAL) 20 MG tablet, Take 1 tablet by mouth 4 (Four) Times a Day., Disp: 120 tablet, Rfl: 3  •  brimonidine (ALPHAGAN) 0.15 % ophthalmic solution, 1 drop 3 (Three) Times a Day., Disp: , Rfl:   •  busPIRone (BUSPAR) 10 MG tablet, Take 1 tablet by mouth  3 (Three) Times a Day., Disp: 90 tablet, Rfl: 0  •  cephalexin (Keflex) 500 MG capsule, Take 1 capsule by mouth 3 (Three) Times a Day for 7 days., Disp: 21 capsule, Rfl: 0  •  desonide (DESOWEN) 0.05 % cream, Apply  topically to the appropriate area as directed., Disp: , Rfl:   •  doxepin (SINEquan) 25 MG capsule, Take 1 capsule by mouth Daily., Disp: 90 capsule, Rfl: 0  •  DULoxetine (CYMBALTA) 60 MG capsule, Take 1 capsule by mouth Daily., Disp: 90 capsule, Rfl: 0  •  Esomeprazole Magnesium (NEXIUM 24HR PO), Take 1 capsule by mouth., Disp: , Rfl:   •  fexofenadine (ALLEGRA ALLERGY) 60 MG tablet, take 1 tablet by oral route 2 times every day, Disp: , Rfl:   •  gabapentin (NEURONTIN) 800 MG tablet, Take 1 tablet by mouth 4 (Four) Times a Day., Disp: 120 tablet, Rfl: 1  •  hydrOXYzine (ATARAX) 25 MG tablet, Take 1 tablet by mouth 2 (Two) Times a Day As Needed for Allergies or Anxiety., Disp: 180 tablet, Rfl: 1  •  ipratropium-albuterol (DUO-NEB) 0.5-2.5 mg/3 ml nebulizer, IPRATROPIUM-ALBUTEROL 0.5-2.5 (3) MG/3ML SOLN, Disp: , Rfl:   •  metoprolol succinate XL (TOPROL-XL) 50 MG 24 hr tablet, Take 1 tablet by mouth Daily., Disp: 90 tablet, Rfl: 0  •  mometasone (NASONEX) 50 MCG/ACT nasal spray, 2 sprays into the nostril(s) as directed by provider Daily., Disp: , Rfl:   •  montelukast (SINGULAIR) 10 MG tablet, Take 1 tablet by mouth Daily., Disp: 90 tablet, Rfl: 0  •  nystatin (MYCOSTATIN) 561208 UNIT/GM powder, Apply  topically to the appropriate area as directed 3 (Three) Times a Day., Disp: 30 g, Rfl: 0  •  oxyCODONE-acetaminophen (PERCOCET)  MG per tablet, 1 tablet., Disp: , Rfl:   •  polyethylene glycol (MIRALAX) powder, Take 17 g by mouth., Disp: , Rfl:   •  Polyethylene Glycol 8000 powder, , Disp: , Rfl:   •  Prenatal Vit-Fe Fumarate-FA (TRINATAL RX 1) 60-1 MG tablet, Take 1 tablet by mouth Daily., Disp: 90 tablet, Rfl: 1  •  promethazine (PHENERGAN) 25 MG tablet, TAKE 1 TABLET EVERY 8 HOURS AS NEEDED,  Disp: 90 tablet, Rfl: 0  •  promethazine (PHENERGAN), Take 1 tablet by mouth Every 4 (Four) Hours., Disp: , Rfl:   •  Saline (OCEAN NASAL SPRAY NA), , Disp: , Rfl:   •  sucralfate (CARAFATE) 1 g tablet, Take 1 tablet by mouth 3 (Three) Times a Day., Disp: 270 tablet, Rfl: 0  •  umeclidinium-vilanterol (ANORO ELLIPTA) 62.5-25 MCG/INH aerosol powder  inhaler, Inhale Daily., Disp: , Rfl:   Past Medical History:   Diagnosis Date   • Allergic    • Anxiety    • Arthritis    • Asthma    • Depression    • GERD (gastroesophageal reflux disease)    • Hypertension    • Kyphosis deformity of spine    • Low back pain    • Obesity    • Osteopenia    • Scoliosis deformity of spine      Past Surgical History:   Procedure Laterality Date   • APPENDECTOMY       Social History     Socioeconomic History   • Marital status:      Spouse name: Not on file   • Number of children: Not on file   • Years of education: Not on file   • Highest education level: Not on file   Tobacco Use   • Smoking status: Never Smoker   • Smokeless tobacco: Never Used   Substance and Sexual Activity   • Alcohol use: No     Frequency: Never   • Drug use: No     Family History   Problem Relation Age of Onset   • Cancer Mother    • Diabetes Mother    • Heart disease Mother    • Cancer Father    • COPD Father    • Cancer Sister      PHQ-2 Depression Screening  Little interest or pleasure in doing things?     Feeling down, depressed, or hopeless?     PHQ-2 Total Score       PHQ-9 Depression Screening  Little interest or pleasure in doing things?     Feeling down, depressed, or hopeless?     Trouble falling or staying asleep, or sleeping too much?     Feeling tired or having little energy?     Poor appetite or overeating?     Feeling bad about yourself - or that you are a failure or have let yourself or your family down?     Trouble concentrating on things, such as reading the newspaper or watching television?     Moving or speaking so slowly that other people  could have noticed? Or the opposite - being so fidgety or restless that you have been moving around a lot more than usual?     Thoughts that you would be better off dead, or of hurting yourself in some way?     PHQ-9 Total Score     If you checked off any problems, how difficult have these problems made it for you to do your work, take care of things at home, or get along with other people?         Family history, surgical history, past medical history, Allergies and med's reviewed with patient today and updated in [facility] EMR.     ROS:  Review of Systems   HENT: Positive for rhinorrhea and sinus pain.    Gastrointestinal: Positive for nausea.   Neurological: Positive for headaches.       OBJECTIVE:  There were no vitals filed for this visit.  There is no height or weight on file to calculate BMI.  Physical Exam    ASSESSMENT/ PLAN:    nA was seen today for uri.    Diagnoses and all orders for this visit:    Viral upper respiratory tract infection        Orders Placed Today:     No orders of the defined types were placed in this encounter.       Management Plan:   This visit has been a phone visit to comply with patient safety concerns in accordance with CDC recommendations. Total time of discussion was 9 minutes.        An After Visit Summary was printed and given to the patient at discharge.    Follow-up: No follow-ups on file.    Haydee Anderson, TYLER 4/14/2020 21:34  This note was electronically signed.

## 2020-04-07 RX ORDER — DOXEPIN HYDROCHLORIDE 25 MG/1
25 CAPSULE ORAL EVERY 24 HOURS
Qty: 90 CAPSULE | Refills: 0 | Status: SHIPPED | OUTPATIENT
Start: 2020-04-07 | End: 2020-07-13 | Stop reason: SDUPTHER

## 2020-04-07 RX ORDER — METOPROLOL SUCCINATE 50 MG/1
50 TABLET, EXTENDED RELEASE ORAL DAILY
Qty: 90 TABLET | Refills: 0 | Status: SHIPPED | OUTPATIENT
Start: 2020-04-07 | End: 2020-06-02 | Stop reason: SDUPTHER

## 2020-04-07 RX ORDER — HYDROXYZINE HYDROCHLORIDE 25 MG/1
25 TABLET, FILM COATED ORAL 2 TIMES DAILY PRN
Qty: 180 TABLET | Refills: 1 | COMMUNITY
Start: 2020-04-07 | End: 2020-06-26

## 2020-04-07 RX ORDER — MONTELUKAST SODIUM 10 MG/1
10 TABLET ORAL DAILY
Qty: 90 TABLET | Refills: 0 | Status: SHIPPED | OUTPATIENT
Start: 2020-04-07 | End: 2020-12-11 | Stop reason: SDUPTHER

## 2020-04-09 ENCOUNTER — OFFICE VISIT (OUTPATIENT)
Dept: FAMILY MEDICINE CLINIC | Facility: CLINIC | Age: 60
End: 2020-04-09

## 2020-04-09 DIAGNOSIS — J02.9 SORE THROAT: Primary | ICD-10-CM

## 2020-04-09 DIAGNOSIS — J01.00 ACUTE MAXILLARY SINUSITIS, RECURRENCE NOT SPECIFIED: ICD-10-CM

## 2020-04-09 PROCEDURE — 99441 PR PHYS/QHP TELEPHONE EVALUATION 5-10 MIN: CPT | Performed by: NURSE PRACTITIONER

## 2020-04-09 RX ORDER — CEPHALEXIN 500 MG/1
500 CAPSULE ORAL 3 TIMES DAILY
Qty: 21 CAPSULE | Refills: 0 | Status: SHIPPED | OUTPATIENT
Start: 2020-04-09 | End: 2020-04-16

## 2020-04-09 NOTE — PROGRESS NOTES
Chief Complaint   Patient presents with   • Sinus Problem     sinusits, sinus drainage x 1 week,  temp was 99.5 2 nights ago       Pt agreed to telephone visit spent 9 minutes on the phone discussing plan of care  Subjective   An Harmon is a 59 y.o.  female who presents today for   Sinusitis   This is a chronic problem. The current episode started 1 to 4 weeks ago. The problem has been gradually worsening since onset. There has been no fever. Her pain is at a severity of 3/10. The pain is mild. Associated symptoms include ear pain, headaches, a hoarse voice, sinus pressure and a sore throat. Past treatments include nothing. The treatment provided mild relief.     An Harmon  has a past medical history of Allergic, Anxiety, Arthritis, Asthma, Depression, GERD (gastroesophageal reflux disease), Hypertension, Kyphosis deformity of spine, Low back pain, Obesity, Osteopenia, and Scoliosis deformity of spine.    Allergies   Allergen Reactions   • Adhesive Tape Rash     tapes     • Amoxicillin-Pot Clavulanate Rash   • Cetirizine Palpitations   • Cimetidine Palpitations   • Clindamycin Rash   • Codeine GI Intolerance   • Doxycycline Rash   • Erythromycin Rash   • Hydrocodone-Acetaminophen GI Intolerance   • Methadone Palpitations   • Norgesic Forte  [Orphenadrine-Aspirin-Caffeine] Rash   • Nsaids GI Intolerance     Severe reaction     • Piroxicam Rash   • Prednisone Palpitations   • Aspirin GI Intolerance   • Hydrocodone Bitartrate Er GI Intolerance   • Ibuprofen GI Intolerance   • Oxycodone-Aspirin GI Intolerance   • Caffeine Palpitations   • Clavulanic Acid Rash   • Methylprednisolone Palpitations   • Penicillins Rash   • Sulfamethoxazole-Trimethoprim Rash   • Terfenadine Rash       Current Outpatient Medications:   •  albuterol sulfate  (90 Base) MCG/ACT inhaler, Inhale 2 puffs., Disp: , Rfl:   •  amitriptyline (ELAVIL) 75 MG tablet, Take 1 tablet by mouth Daily., Disp: 90 tablet, Rfl: 1  •   baclofen (LIORESAL) 20 MG tablet, Take 1 tablet by mouth 4 (Four) Times a Day., Disp: 120 tablet, Rfl: 3  •  brimonidine (ALPHAGAN) 0.15 % ophthalmic solution, 1 drop 3 (Three) Times a Day., Disp: , Rfl:   •  busPIRone (BUSPAR) 10 MG tablet, Take 1 tablet by mouth 3 (Three) Times a Day., Disp: 90 tablet, Rfl: 0  •  desonide (DESOWEN) 0.05 % cream, Apply  topically to the appropriate area as directed., Disp: , Rfl:   •  doxepin (SINEquan) 25 MG capsule, Take 1 capsule by mouth Daily., Disp: 90 capsule, Rfl: 0  •  DULoxetine (CYMBALTA) 60 MG capsule, Take 1 capsule by mouth Daily., Disp: 90 capsule, Rfl: 0  •  Esomeprazole Magnesium (NEXIUM 24HR PO), Take 1 capsule by mouth., Disp: , Rfl:   •  fexofenadine (ALLEGRA ALLERGY) 60 MG tablet, take 1 tablet by oral route 2 times every day, Disp: , Rfl:   •  gabapentin (NEURONTIN) 800 MG tablet, Take 1 tablet by mouth 4 (Four) Times a Day., Disp: 120 tablet, Rfl: 1  •  hydrOXYzine (ATARAX) 25 MG tablet, Take 1 tablet by mouth 2 (Two) Times a Day As Needed for Allergies or Anxiety., Disp: 180 tablet, Rfl: 1  •  ipratropium-albuterol (DUO-NEB) 0.5-2.5 mg/3 ml nebulizer, IPRATROPIUM-ALBUTEROL 0.5-2.5 (3) MG/3ML SOLN, Disp: , Rfl:   •  metoprolol succinate XL (TOPROL-XL) 50 MG 24 hr tablet, Take 1 tablet by mouth Daily., Disp: 90 tablet, Rfl: 0  •  mometasone (NASONEX) 50 MCG/ACT nasal spray, 2 sprays into the nostril(s) as directed by provider Daily., Disp: , Rfl:   •  montelukast (SINGULAIR) 10 MG tablet, Take 1 tablet by mouth Daily., Disp: 90 tablet, Rfl: 0  •  nystatin (MYCOSTATIN) 948687 UNIT/GM powder, Apply  topically to the appropriate area as directed 3 (Three) Times a Day., Disp: 30 g, Rfl: 0  •  oxyCODONE-acetaminophen (PERCOCET)  MG per tablet, 1 tablet., Disp: , Rfl:   •  polyethylene glycol (MIRALAX) powder, Take 17 g by mouth., Disp: , Rfl:   •  Polyethylene Glycol 8000 powder, , Disp: , Rfl:   •  Prenatal Vit-Fe Fumarate-FA (TRINATAL RX 1) 60-1 MG tablet,  Take 1 tablet by mouth Daily., Disp: 90 tablet, Rfl: 1  •  promethazine (PHENERGAN) 25 MG tablet, TAKE 1 TABLET EVERY 8 HOURS AS NEEDED, Disp: 90 tablet, Rfl: 0  •  promethazine (PHENERGAN), Take 1 tablet by mouth Every 4 (Four) Hours., Disp: , Rfl:   •  Saline (OCEAN NASAL SPRAY NA), , Disp: , Rfl:   •  umeclidinium-vilanterol (ANORO ELLIPTA) 62.5-25 MCG/INH aerosol powder  inhaler, Inhale Daily., Disp: , Rfl:   •  cephalexin (Keflex) 500 MG capsule, Take 1 capsule by mouth 3 (Three) Times a Day for 7 days., Disp: 21 capsule, Rfl: 0  •  sucralfate (CARAFATE) 1 g tablet, Take 1 tablet by mouth 3 (Three) Times a Day., Disp: 270 tablet, Rfl: 0  Past Medical History:   Diagnosis Date   • Allergic    • Anxiety    • Arthritis    • Asthma    • Depression    • GERD (gastroesophageal reflux disease)    • Hypertension    • Kyphosis deformity of spine    • Low back pain    • Obesity    • Osteopenia    • Scoliosis deformity of spine      Past Surgical History:   Procedure Laterality Date   • APPENDECTOMY       Social History     Socioeconomic History   • Marital status:      Spouse name: Not on file   • Number of children: Not on file   • Years of education: Not on file   • Highest education level: Not on file   Tobacco Use   • Smoking status: Never Smoker   • Smokeless tobacco: Never Used   Substance and Sexual Activity   • Alcohol use: No     Frequency: Never   • Drug use: No     Family History   Problem Relation Age of Onset   • Cancer Mother    • Diabetes Mother    • Heart disease Mother    • Cancer Father    • COPD Father    • Cancer Sister      PHQ-2 Depression Screening  Little interest or pleasure in doing things?     Feeling down, depressed, or hopeless?     PHQ-2 Total Score       PHQ-9 Depression Screening  Little interest or pleasure in doing things?     Feeling down, depressed, or hopeless?     Trouble falling or staying asleep, or sleeping too much?     Feeling tired or having little energy?     Poor  appetite or overeating?     Feeling bad about yourself - or that you are a failure or have let yourself or your family down?     Trouble concentrating on things, such as reading the newspaper or watching television?     Moving or speaking so slowly that other people could have noticed? Or the opposite - being so fidgety or restless that you have been moving around a lot more than usual?     Thoughts that you would be better off dead, or of hurting yourself in some way?     PHQ-9 Total Score     If you checked off any problems, how difficult have these problems made it for you to do your work, take care of things at home, or get along with other people?         Family history, surgical history, past medical history, Allergies and med's reviewed with patient today and updated in [facility] EMR.     ROS:  Review of Systems   HENT: Positive for ear pain, hoarse voice, sinus pressure and sore throat.    Neurological: Positive for headaches.   All other systems reviewed and are negative.      OBJECTIVE:  There were no vitals filed for this visit.  There is no height or weight on file to calculate BMI.  Physical Exam    ASSESSMENT/ PLAN:    An was seen today for sinus problem.    Diagnoses and all orders for this visit:    Sore throat  -     cephalexin (Keflex) 500 MG capsule; Take 1 capsule by mouth 3 (Three) Times a Day for 7 days.    Acute maxillary sinusitis, recurrence not specified        Orders Placed Today:     New Medications Ordered This Visit   Medications   • cephalexin (Keflex) 500 MG capsule     Sig: Take 1 capsule by mouth 3 (Three) Times a Day for 7 days.     Dispense:  21 capsule     Refill:  0        Management Plan:     An After Visit Summary was printed and given to the patient at discharge.    Follow-up: No follow-ups on file.    Haydee Anderson, APRN 4/14/2020 22:19  This note was electronically signed.

## 2020-04-17 RX ORDER — PROMETHAZINE HYDROCHLORIDE 25 MG/1
TABLET ORAL
Qty: 90 TABLET | Refills: 0 | Status: SHIPPED | OUTPATIENT
Start: 2020-04-17 | End: 2020-06-01 | Stop reason: SDUPTHER

## 2020-04-28 ENCOUNTER — TELEPHONE (OUTPATIENT)
Dept: FAMILY MEDICINE CLINIC | Facility: CLINIC | Age: 60
End: 2020-04-28

## 2020-04-28 NOTE — TELEPHONE ENCOUNTER
"Fell this morning while on her bedside commode and landed in a wooden box along with the potty  \"Hurt all over\"  Hit her head  Denies LOC  She is on aspirin  She is concerned she has broken bones  Initially adamant that she did not want to go to the ER  Strongly encouraged her to go and explained why  I told her that the other option is to be seen at [facility]'s office in the morning  She was finally agreeable to go to ER    "

## 2020-05-19 DIAGNOSIS — N20.0 NEPHROLITHIASIS: Primary | ICD-10-CM

## 2020-05-19 RX ORDER — CIPROFLOXACIN 500 MG/1
500 TABLET, FILM COATED ORAL 2 TIMES DAILY
Qty: 14 TABLET | Refills: 0 | Status: SHIPPED | OUTPATIENT
Start: 2020-05-19 | End: 2020-05-26

## 2020-05-19 RX ORDER — PHENAZOPYRIDINE HYDROCHLORIDE 200 MG/1
200 TABLET, FILM COATED ORAL 3 TIMES DAILY PRN
Qty: 6 TABLET | Refills: 0 | Status: SHIPPED | OUTPATIENT
Start: 2020-05-19 | End: 2020-05-21

## 2020-05-19 NOTE — PROGRESS NOTES
Call while on call albertina from [place].  She tells me she has had multiple kidney stones over her life and typically they are quite large.  She states for the past 2 or 3 days she has had intermittent volume to urine flow.  Earlier today she had very weak urine stream even with straining.  This was accompanied by pelvic pain.  Finally, the urine began to flow strongly and there was blood in the urine.  She retrieved what she identified as a kidney stone from the toilet.  Her urine remains tea colored.  She denies fever, nausea, vomiting.  She tells me that she cannot take most antibiotics typically is treated with Cipro and a numbing medicine when she passes a kidney stone.  She declined to go to the urgent care, tonight and I think that is probably [facility], and she felt so confident this is what was happening but she was very comfortable if I sent in a course of Cipro and Pyridium for her.  In fact, that was her request.  I told her that without urinalysis, it might be difficult in the future to pin down exactly what the bacterial cause of an infection was, if indeed one exists.  She understood that.  She agrees to drink lots of water and she agreed to be reevaluated if she did not feel better in 48 hours.    Diagnoses and all orders for this visit:    1. Nephrolithiasis (Primary)  -     ciprofloxacin (Cipro) 500 MG tablet; Take 1 tablet by mouth 2 (Two) Times a Day for 7 days.  Dispense: 14 tablet; Refill: 0  -     phenazopyridine (Pyridium) 200 MG tablet; Take 1 tablet by mouth 3 (Three) Times a Day As Needed for Bladder Spasms for up to 2 days.  Dispense: 6 tablet; Refill: 0

## 2020-06-01 RX ORDER — PROMETHAZINE HYDROCHLORIDE 25 MG/1
25 TABLET ORAL EVERY 8 HOURS PRN
Qty: 90 TABLET | Refills: 0 | Status: SHIPPED | OUTPATIENT
Start: 2020-06-01 | End: 2020-07-13 | Stop reason: SDUPTHER

## 2020-06-01 NOTE — TELEPHONE ENCOUNTER
Date of last refill:04/17/2020    Is there an Inspect on file:    Last appt date:04/09/2020     Next appt date:N/A      Additional information:

## 2020-06-02 DIAGNOSIS — F41.9 ANXIETY: ICD-10-CM

## 2020-06-02 RX ORDER — METOPROLOL SUCCINATE 50 MG/1
50 TABLET, EXTENDED RELEASE ORAL DAILY
Qty: 90 TABLET | Refills: 0 | Status: SHIPPED | OUTPATIENT
Start: 2020-06-02 | End: 2020-08-24 | Stop reason: SDUPTHER

## 2020-06-02 RX ORDER — BUSPIRONE HYDROCHLORIDE 10 MG/1
10 TABLET ORAL 3 TIMES DAILY
Qty: 90 TABLET | Refills: 0 | Status: SHIPPED | OUTPATIENT
Start: 2020-06-02 | End: 2020-06-30

## 2020-06-02 NOTE — TELEPHONE ENCOUNTER
Date of last refill: 4-7-2020    Is there an Inspect on file: n/a    Last appt date: 4-9-2020     Next appt date: NONE       Additional information:  Patient states that she is out of medication.

## 2020-06-02 NOTE — TELEPHONE ENCOUNTER
Date of last refill: 3-6-2020    Is there an Inspect on file: n/a    Last appt date: 4-9-2020     Next appt date: NONE       Additional information:

## 2020-06-12 ENCOUNTER — TELEPHONE (OUTPATIENT)
Dept: FAMILY MEDICINE CLINIC | Facility: CLINIC | Age: 60
End: 2020-06-12

## 2020-06-12 RX ORDER — CIPROFLOXACIN 500 MG/1
500 TABLET, FILM COATED ORAL 2 TIMES DAILY
Qty: 20 TABLET | Refills: 0 | Status: SHIPPED | OUTPATIENT
Start: 2020-06-12 | End: 2020-10-28

## 2020-06-12 NOTE — TELEPHONE ENCOUNTER
Pt called c/o sinus congestion and facial pain for days  Also c/o dysuria   Asking for rx for cipro  Says it is one she can tolerate  Says she called her doctor's office today but never received a call back

## 2020-06-26 NOTE — TELEPHONE ENCOUNTER
Date of last refill: 4-7-2020    Is there an Inspect on file: n/a    Last appt date: 4-9-2020     Next appt date: none       Additional information:

## 2020-06-29 NOTE — TELEPHONE ENCOUNTER
Date of last refill:03/17/2020    Is there an Inspect on file: NA    Last appt date:04/09/2020     Next appt date: NONE      Additional information:

## 2020-06-30 DIAGNOSIS — F41.9 ANXIETY: ICD-10-CM

## 2020-06-30 RX ORDER — BUSPIRONE HYDROCHLORIDE 10 MG/1
10 TABLET ORAL 3 TIMES DAILY
Qty: 90 TABLET | Refills: 0 | Status: SHIPPED | OUTPATIENT
Start: 2020-06-30 | End: 2020-07-13 | Stop reason: SDUPTHER

## 2020-06-30 RX ORDER — NYSTATIN 100000 [USP'U]/G
POWDER TOPICAL 3 TIMES DAILY
Qty: 30 G | Refills: 0 | Status: SHIPPED | OUTPATIENT
Start: 2020-06-30 | End: 2020-07-07 | Stop reason: SDUPTHER

## 2020-06-30 RX ORDER — HYDROXYZINE HYDROCHLORIDE 25 MG/1
TABLET, FILM COATED ORAL
Qty: 270 TABLET | Refills: 0 | Status: SHIPPED | OUTPATIENT
Start: 2020-06-30 | End: 2020-12-11 | Stop reason: SDUPTHER

## 2020-06-30 NOTE — TELEPHONE ENCOUNTER
Date of last refill: 6-2-2020    Is there an Inspect on file: n/a    Last appt date: 4-9-2020     Next appt date: none       Additional information: 30 DAY SUPPLY ONLY. NOTE PLACED ON SIG THAT PATIENT MUST SCHEDULE APPT FOR  FURTHER REFILLS.

## 2020-07-07 RX ORDER — NYSTATIN 100000 [USP'U]/G
POWDER TOPICAL 3 TIMES DAILY
Qty: 30 G | Refills: 0 | Status: SHIPPED | OUTPATIENT
Start: 2020-07-07 | End: 2020-12-07 | Stop reason: SDUPTHER

## 2020-07-07 NOTE — TELEPHONE ENCOUNTER
Date of last refill:06/30/2020    Is there an Inspect on file:N/A    Last appt date:04/09/2020     Next appt date:N/A      Additional information:

## 2020-07-13 DIAGNOSIS — F41.9 ANXIETY: ICD-10-CM

## 2020-07-13 RX ORDER — PROMETHAZINE HYDROCHLORIDE 25 MG/1
25 TABLET ORAL EVERY 8 HOURS PRN
Qty: 90 TABLET | Refills: 0 | Status: SHIPPED | OUTPATIENT
Start: 2020-07-13 | End: 2020-08-24 | Stop reason: SDUPTHER

## 2020-07-13 RX ORDER — BUSPIRONE HYDROCHLORIDE 10 MG/1
10 TABLET ORAL 3 TIMES DAILY
Qty: 90 TABLET | Refills: 0 | Status: SHIPPED | OUTPATIENT
Start: 2020-07-13 | End: 2020-12-11 | Stop reason: SDUPTHER

## 2020-07-13 RX ORDER — DOXEPIN HYDROCHLORIDE 25 MG/1
25 CAPSULE ORAL EVERY 24 HOURS
Qty: 90 CAPSULE | Refills: 0 | Status: SHIPPED | OUTPATIENT
Start: 2020-07-13 | End: 2021-01-29 | Stop reason: SDUPTHER

## 2020-07-13 RX ORDER — AMITRIPTYLINE HYDROCHLORIDE 75 MG/1
75 TABLET, FILM COATED ORAL EVERY 24 HOURS
Qty: 90 TABLET | Refills: 1 | Status: SHIPPED | OUTPATIENT
Start: 2020-07-13 | End: 2021-01-26 | Stop reason: SDUPTHER

## 2020-07-13 RX ORDER — BACLOFEN 20 MG/1
20 TABLET ORAL 4 TIMES DAILY
Qty: 120 TABLET | Refills: 3 | Status: SHIPPED | OUTPATIENT
Start: 2020-07-13 | End: 2020-12-11 | Stop reason: SDUPTHER

## 2020-07-13 RX ORDER — DULOXETIN HYDROCHLORIDE 60 MG/1
60 CAPSULE, DELAYED RELEASE ORAL DAILY
Qty: 90 CAPSULE | Refills: 0 | Status: SHIPPED | OUTPATIENT
Start: 2020-07-13 | End: 2021-01-28

## 2020-07-13 NOTE — TELEPHONE ENCOUNTER
Date of last refill:07/07/2020    Is there an Inspect on file:N/A    Last appt date:04/09/2020     Next appt date:N/A      Additional information:

## 2020-07-13 NOTE — TELEPHONE ENCOUNTER
NAM CALLED WANTING TO TALK TO YOU.  WOULD NOT GIVE ME ANY INFORMATION SAID SHE JUST HAD A QUESTION FOR YOU

## 2020-07-24 RX ORDER — GABAPENTIN 800 MG/1
800 TABLET ORAL 4 TIMES DAILY
Qty: 120 TABLET | Refills: 0 | Status: SHIPPED | OUTPATIENT
Start: 2020-07-24 | End: 2020-08-24 | Stop reason: SDUPTHER

## 2020-07-24 NOTE — TELEPHONE ENCOUNTER
Date of last refill: 3-    Is there an Inspect on file: N/A    Last appt date: 4-9-2020     Next appt date: NONE       Additional information:  NOTE PLACED ON PT SIG FOR HER TO CALL OFFICE TO SCHEDULE APPT FOR FURTHER REFILLS.

## 2020-08-03 NOTE — TELEPHONE ENCOUNTER
Date of last refill:11/07/2019  Is there an Inspect on file:NA    Last appt date: 04/09/2020     Next appt date: NONE      Additional information:

## 2020-08-24 RX ORDER — ESOMEPRAZOLE MAGNESIUM 40 MG/1
40 CAPSULE, DELAYED RELEASE ORAL 2 TIMES DAILY
COMMUNITY
End: 2020-08-24 | Stop reason: SDUPTHER

## 2020-08-24 NOTE — TELEPHONE ENCOUNTER
Date of last refill:    GABAPENTIN: 7-  NEXIUM: 8-3-2020  TOPROL: 6-2-2020  PHENERGAN: 7-    Is there an Inspect on file: YES- UPDATED ON 8/24/2020 AND IS SCANNED INTO CHART     Last appt date: 4-9-2020     Next appt date: NONE       Additional information:  CONTACTED PT TO ADVISE HER THAT SHE NEEDS AN APPT. NO ANSWER. LVM ADVISING PT TO CONTACT OFFICE TO SCHEDULE APPT.

## 2020-08-25 RX ORDER — ESOMEPRAZOLE MAGNESIUM 40 MG/1
40 CAPSULE, DELAYED RELEASE ORAL 2 TIMES DAILY
Qty: 180 CAPSULE | Refills: 0 | Status: SHIPPED | OUTPATIENT
Start: 2020-08-25 | End: 2021-01-13

## 2020-08-25 RX ORDER — GABAPENTIN 800 MG/1
800 TABLET ORAL 4 TIMES DAILY
Qty: 360 TABLET | Refills: 0 | Status: SHIPPED | OUTPATIENT
Start: 2020-08-25 | End: 2020-09-03 | Stop reason: SDUPTHER

## 2020-08-25 RX ORDER — METOPROLOL SUCCINATE 50 MG/1
50 TABLET, EXTENDED RELEASE ORAL DAILY
Qty: 90 TABLET | Refills: 0 | Status: SHIPPED | OUTPATIENT
Start: 2020-08-25 | End: 2020-12-01 | Stop reason: SDUPTHER

## 2020-08-25 RX ORDER — PROMETHAZINE HYDROCHLORIDE 25 MG/1
25 TABLET ORAL EVERY 8 HOURS PRN
Qty: 90 TABLET | Refills: 0 | Status: SHIPPED | OUTPATIENT
Start: 2020-08-25 | End: 2020-09-05 | Stop reason: SDUPTHER

## 2020-09-01 PROBLEM — N81.10 FEMALE BLADDER PROLAPSE: Status: ACTIVE | Noted: 2020-09-01

## 2020-09-01 PROBLEM — N32.81 OVERACTIVE BLADDER: Status: ACTIVE | Noted: 2019-02-06

## 2020-09-01 PROBLEM — M51.36 DEGENERATION OF INTERVERTEBRAL DISC OF LUMBAR REGION: Status: ACTIVE | Noted: 2020-09-01

## 2020-09-01 PROBLEM — D32.9 BENIGN NEOPLASM OF MENINGES (HCC): Status: ACTIVE | Noted: 2020-09-01

## 2020-09-01 PROBLEM — M51.369 DEGENERATION OF INTERVERTEBRAL DISC OF LUMBAR REGION: Status: ACTIVE | Noted: 2020-09-01

## 2020-09-01 PROBLEM — M50.30 DEGENERATION OF INTERVERTEBRAL DISC OF CERVICAL REGION: Status: ACTIVE | Noted: 2020-09-01

## 2020-09-01 PROBLEM — I10 HYPERTENSION: Status: ACTIVE | Noted: 2019-02-06

## 2020-09-01 PROBLEM — J45.909 ASTHMA: Status: ACTIVE | Noted: 2019-02-06

## 2020-09-01 PROBLEM — R09.02 HYPOXEMIA: Status: ACTIVE | Noted: 2019-03-19

## 2020-09-01 PROBLEM — G89.29 CHRONIC PAIN: Status: ACTIVE | Noted: 2019-02-06

## 2020-09-01 PROBLEM — E04.1 CYST OF THYROID: Status: ACTIVE | Noted: 2020-09-01

## 2020-09-01 PROBLEM — M05.9 RHEUMATOID ARTHRITIS WITH RHEUMATOID FACTOR: Status: ACTIVE | Noted: 2019-02-06

## 2020-09-01 PROBLEM — M48.00 SPINAL STENOSIS: Status: ACTIVE | Noted: 2019-05-28

## 2020-09-01 PROBLEM — M54.50 CHRONIC LOW BACK PAIN: Status: ACTIVE | Noted: 2020-09-01

## 2020-09-01 PROBLEM — R73.03 PREDIABETES: Status: ACTIVE | Noted: 2020-09-01

## 2020-09-01 PROBLEM — N81.6 RECTOCELE: Status: ACTIVE | Noted: 2020-09-01

## 2020-09-01 PROBLEM — R26.89 FUNCTIONAL GAIT ABNORMALITY: Status: ACTIVE | Noted: 2019-02-06

## 2020-09-01 PROBLEM — R76.8 ELEVATED RHEUMATOID FACTOR: Status: ACTIVE | Noted: 2019-04-24

## 2020-09-01 PROBLEM — M51.34 DEGENERATION OF INTERVERTEBRAL DISC OF THORACIC REGION: Status: ACTIVE | Noted: 2020-09-01

## 2020-09-01 PROBLEM — M41.9 SCOLIOSIS DEFORMITY OF SPINE: Status: ACTIVE | Noted: 2019-05-28

## 2020-09-01 PROBLEM — G47.30 SLEEP APNEA: Status: ACTIVE | Noted: 2020-09-01

## 2020-09-01 PROBLEM — M96.1 FAILED BACK SYNDROME: Status: ACTIVE | Noted: 2019-02-06

## 2020-09-01 PROBLEM — D86.0 PULMONARY SARCOIDOSIS (HCC): Status: ACTIVE | Noted: 2019-05-28

## 2020-09-01 PROBLEM — F41.8 MIXED ANXIETY DEPRESSIVE DISORDER: Status: ACTIVE | Noted: 2020-09-01

## 2020-09-01 PROBLEM — D49.6 BRAIN TUMOR: Status: ACTIVE | Noted: 2019-02-06

## 2020-09-01 PROBLEM — J44.9 CHRONIC OBSTRUCTIVE PULMONARY DISEASE (HCC): Status: ACTIVE | Noted: 2020-09-01

## 2020-09-01 PROBLEM — G89.29 CHRONIC LOW BACK PAIN: Status: ACTIVE | Noted: 2020-09-01

## 2020-09-01 PROBLEM — M40.202 KYPHOSIS OF CERVICAL REGION: Status: ACTIVE | Noted: 2020-09-01

## 2020-09-01 PROBLEM — K21.9 GASTROESOPHAGEAL REFLUX DISEASE: Status: ACTIVE | Noted: 2020-09-01

## 2020-09-01 PROBLEM — R60.0 PEDAL EDEMA: Status: ACTIVE | Noted: 2019-05-28

## 2020-09-01 PROBLEM — H40.9 GLAUCOMA: Status: ACTIVE | Noted: 2020-09-01

## 2020-09-01 PROBLEM — K59.00 CONSTIPATION: Status: ACTIVE | Noted: 2020-09-01

## 2020-09-03 DIAGNOSIS — J02.9 SORE THROAT: ICD-10-CM

## 2020-09-03 RX ORDER — CEPHALEXIN 500 MG/1
500 CAPSULE ORAL 3 TIMES DAILY
Qty: 21 CAPSULE | Refills: 0 | OUTPATIENT
Start: 2020-09-03 | End: 2020-09-10

## 2020-09-03 RX ORDER — GABAPENTIN 800 MG/1
800 TABLET ORAL 4 TIMES DAILY
Qty: 120 TABLET | Refills: 0 | Status: SHIPPED | OUTPATIENT
Start: 2020-09-03 | End: 2020-12-17

## 2020-09-03 NOTE — TELEPHONE ENCOUNTER
SPOKE WITH [facility] PHARMACIST JOSHUA AND SHE STATED THAT IT WAS SHIPPED OUT YESTERDAY 09/02 WITH A 09/05 ARRIVAL DATE.  NAM CALLED STATING THAT SHE IS OUT AND WOULD LIKE A FEW SENT TO [facility] IN [place].

## 2020-09-03 NOTE — TELEPHONE ENCOUNTER
Date of last refill:04/09/2020    Is there an Inspect on file:N/A    Last appt date:04/09/2020     Next appt date:N/A      Additional information:

## 2020-09-05 ENCOUNTER — TELEPHONE (OUTPATIENT)
Dept: FAMILY MEDICINE CLINIC | Facility: CLINIC | Age: 60
End: 2020-09-05

## 2020-09-05 RX ORDER — PROMETHAZINE HYDROCHLORIDE 25 MG/1
25 TABLET ORAL EVERY 8 HOURS PRN
Qty: 30 TABLET | Refills: 0 | Status: SHIPPED | OUTPATIENT
Start: 2020-09-05 | End: 2020-10-13 | Stop reason: SDUPTHER

## 2020-10-13 NOTE — TELEPHONE ENCOUNTER
Date of last refill: 9-5-2020    Is there an Inspect on file: n/a  Last appt date: 4-9-2020     Next appt date: none       Additional information:

## 2020-10-14 RX ORDER — PROMETHAZINE HYDROCHLORIDE 25 MG/1
25 TABLET ORAL EVERY 8 HOURS PRN
Qty: 30 TABLET | Refills: 0 | Status: SHIPPED | OUTPATIENT
Start: 2020-10-14 | End: 2020-11-10

## 2020-10-14 NOTE — TELEPHONE ENCOUNTER
She stated that she had forgot about her appointment. This visit would be for DME purposes. Do you still want a telephone visit?

## 2020-10-14 NOTE — TELEPHONE ENCOUNTER
She missed her f/u appt if she isn't able to come in due to risk of Covid exposure please let her know we can do a telephone visit

## 2020-10-14 NOTE — TELEPHONE ENCOUNTER
As far as oxygen, a walking test will be needed, so an office visit would be needed. However, for patient's request of an adjustable bed, meticulous detailed documentation would just be needed. S/w patient. She is going to call insurance and see which DME company her insurance wants her to use, and will call us back. She said that she has to sleep sitting straight up due to her back.

## 2020-10-28 ENCOUNTER — OFFICE VISIT (OUTPATIENT)
Dept: FAMILY MEDICINE CLINIC | Facility: CLINIC | Age: 60
End: 2020-10-28

## 2020-10-28 DIAGNOSIS — E78.2 MIXED HYPERLIPIDEMIA: ICD-10-CM

## 2020-10-28 DIAGNOSIS — E53.8 VITAMIN B12 DEFICIENCY: ICD-10-CM

## 2020-10-28 DIAGNOSIS — R53.83 FATIGUE, UNSPECIFIED TYPE: ICD-10-CM

## 2020-10-28 DIAGNOSIS — E03.9 HYPOTHYROIDISM, UNSPECIFIED TYPE: ICD-10-CM

## 2020-10-28 DIAGNOSIS — R59.9 LYMPH NODE ENLARGEMENT: Primary | ICD-10-CM

## 2020-10-28 DIAGNOSIS — E11.65 TYPE 2 DIABETES MELLITUS WITH HYPERGLYCEMIA, WITHOUT LONG-TERM CURRENT USE OF INSULIN (HCC): ICD-10-CM

## 2020-10-28 DIAGNOSIS — E55.9 VITAMIN D DEFICIENCY: ICD-10-CM

## 2020-10-28 PROCEDURE — 99441 PR PHYS/QHP TELEPHONE EVALUATION 5-10 MIN: CPT | Performed by: NURSE PRACTITIONER

## 2020-10-28 RX ORDER — CIPROFLOXACIN 500 MG/1
500 TABLET, FILM COATED ORAL 2 TIMES DAILY
Qty: 14 TABLET | Refills: 0 | Status: SHIPPED | OUTPATIENT
Start: 2020-10-28 | End: 2020-11-03 | Stop reason: SDUPTHER

## 2020-10-28 NOTE — PROGRESS NOTES
Chief Complaint   Patient presents with   • Sinusitis   • Fatigue    You have chosen to receive care through a telephone visit. Do you consent to use a telephone visit for your medical care today? Yes      Subjective   An Harmon is a 60 y.o.  female who presents today for   Sinusitis  This is a chronic problem. The current episode started 1 to 4 weeks ago. The problem has been gradually worsening since onset. There has been no fever. Her pain is at a severity of 2/10. The pain is mild. Associated symptoms include a hoarse voice, sinus pressure, sneezing and swollen glands. Past treatments include acetaminophen, oral decongestants and saline sprays. The treatment provided mild relief.     An Harmon  has a past medical history of Allergic, Anxiety, Arthritis, Asthma, Depression, GERD (gastroesophageal reflux disease), Hypertension, Kyphosis deformity of spine, Low back pain, Obesity, Osteopenia, and Scoliosis deformity of spine.    Allergies   Allergen Reactions   • Adhesive Tape Rash     tapes     • Amoxicillin-Pot Clavulanate Rash   • Cetirizine Palpitations   • Cimetidine Palpitations   • Clindamycin Rash   • Codeine GI Intolerance   • Doxycycline Rash   • Erythromycin Rash   • Hydrocodone-Acetaminophen GI Intolerance   • Methadone Palpitations   • Norgesic Forte  [Orphenadrine-Aspirin-Caffeine] Rash   • Nsaids GI Intolerance     Severe reaction     • Piroxicam Rash   • Prednisone Palpitations   • Aspirin GI Intolerance   • Hydrocodone Bitartrate Er GI Intolerance   • Ibuprofen GI Intolerance   • Oxycodone-Aspirin GI Intolerance   • Caffeine Palpitations   • Clavulanic Acid Rash   • Methylprednisolone Palpitations   • Penicillins Rash   • Sulfamethoxazole-Trimethoprim Rash   • Terfenadine Rash       Current Outpatient Medications:   •  albuterol sulfate  (90 Base) MCG/ACT inhaler, Inhale 2 puffs., Disp: , Rfl:   •  amitriptyline (ELAVIL) 75 MG tablet, Take 1 tablet by mouth Daily., Disp:  90 tablet, Rfl: 1  •  baclofen (LIORESAL) 20 MG tablet, Take 1 tablet by mouth 4 (Four) Times a Day., Disp: 120 tablet, Rfl: 3  •  brimonidine (ALPHAGAN) 0.15 % ophthalmic solution, 1 drop 3 (Three) Times a Day., Disp: , Rfl:   •  busPIRone (BUSPAR) 10 MG tablet, Take 1 tablet by mouth 3 (Three) Times a Day. 1 tab po TID. MUST MAKE APPT TO RECEIVE FURTHER REFILLS. THANK YOU., Disp: 90 tablet, Rfl: 0  •  ciprofloxacin (Cipro) 500 MG tablet, Take 1 tablet by mouth 2 (Two) Times a Day for 7 days., Disp: 14 tablet, Rfl: 0  •  desonide (DESOWEN) 0.05 % cream, Apply  topically to the appropriate area as directed., Disp: , Rfl:   •  doxepin (SINEquan) 25 MG capsule, Take 1 capsule by mouth Daily., Disp: 90 capsule, Rfl: 0  •  DULoxetine (CYMBALTA) 60 MG capsule, Take 1 capsule by mouth Daily., Disp: 90 capsule, Rfl: 0  •  esomeprazole (nexIUM) 40 MG capsule, Take 1 capsule by mouth 2 (two) times a day., Disp: 180 capsule, Rfl: 0  •  fexofenadine (ALLEGRA ALLERGY) 60 MG tablet, take 1 tablet by oral route 2 times every day, Disp: , Rfl:   •  gabapentin (NEURONTIN) 800 MG tablet, Take 1 tablet by mouth 4 (Four) Times a Day., Disp: 120 tablet, Rfl: 0  •  hydrOXYzine (ATARAX) 25 MG tablet, TAKE 1 TABLET THREE TIMES DAILY, Disp: 270 tablet, Rfl: 0  •  ipratropium-albuterol (DUO-NEB) 0.5-2.5 mg/3 ml nebulizer, IPRATROPIUM-ALBUTEROL 0.5-2.5 (3) MG/3ML SOLN, Disp: , Rfl:   •  metoprolol succinate XL (TOPROL-XL) 50 MG 24 hr tablet, Take 1 tablet by mouth Daily., Disp: 90 tablet, Rfl: 0  •  mometasone (NASONEX) 50 MCG/ACT nasal spray, 2 sprays into the nostril(s) as directed by provider Daily., Disp: , Rfl:   •  montelukast (SINGULAIR) 10 MG tablet, Take 1 tablet by mouth Daily., Disp: 90 tablet, Rfl: 0  •  nystatin (MYCOSTATIN) 699302 UNIT/GM powder, Apply  topically to the appropriate area as directed 3 (Three) Times a Day., Disp: 30 g, Rfl: 0  •  oxyCODONE-acetaminophen (PERCOCET)  MG per tablet, 1 tablet., Disp: , Rfl:   •   polyethylene glycol (MIRALAX) powder, Take 17 g by mouth., Disp: , Rfl:   •  Polyethylene Glycol 8000 powder, , Disp: , Rfl:   •  Prenatal Vit-Fe Fumarate-FA (TRINATAL RX 1) 60-1 MG tablet, Take 1 tablet by mouth Daily., Disp: 90 tablet, Rfl: 1  •  promethazine (PHENERGAN) 25 MG tablet, Take 1 tablet by mouth Every 8 (Eight) Hours As Needed for Nausea or Vomiting., Disp: 30 tablet, Rfl: 0  •  promethazine (PHENERGAN), Take 1 tablet by mouth Every 4 (Four) Hours., Disp: , Rfl:   •  Saline (OCEAN NASAL SPRAY NA), , Disp: , Rfl:   •  sucralfate (CARAFATE) 1 g tablet, Take 1 tablet by mouth 3 (Three) Times a Day., Disp: 270 tablet, Rfl: 0  •  umeclidinium-vilanterol (ANORO ELLIPTA) 62.5-25 MCG/INH aerosol powder  inhaler, Inhale Daily., Disp: , Rfl:   Past Medical History:   Diagnosis Date   • Allergic    • Anxiety    • Arthritis    • Asthma    • Depression    • GERD (gastroesophageal reflux disease)    • Hypertension    • Kyphosis deformity of spine    • Low back pain    • Obesity    • Osteopenia    • Scoliosis deformity of spine      Past Surgical History:   Procedure Laterality Date   • APPENDECTOMY       Social History     Socioeconomic History   • Marital status:      Spouse name: Not on file   • Number of children: Not on file   • Years of education: Not on file   • Highest education level: Not on file   Tobacco Use   • Smoking status: Never Smoker   • Smokeless tobacco: Never Used   Substance and Sexual Activity   • Alcohol use: No     Frequency: Never   • Drug use: No     Family History   Problem Relation Age of Onset   • Cancer Mother    • Diabetes Mother    • Heart disease Mother    • Cancer Father    • COPD Father    • Cancer Sister      PHQ-2 Depression Screening  Little interest or pleasure in doing things?     Feeling down, depressed, or hopeless?     PHQ-2 Total Score       PHQ-9 Depression Screening  Little interest or pleasure in doing things?     Feeling down, depressed, or hopeless?      Trouble falling or staying asleep, or sleeping too much?     Feeling tired or having little energy?     Poor appetite or overeating?     Feeling bad about yourself - or that you are a failure or have let yourself or your family down?     Trouble concentrating on things, such as reading the newspaper or watching television?     Moving or speaking so slowly that other people could have noticed? Or the opposite - being so fidgety or restless that you have been moving around a lot more than usual?     Thoughts that you would be better off dead, or of hurting yourself in some way?     PHQ-9 Total Score     If you checked off any problems, how difficult have these problems made it for you to do your work, take care of things at home, or get along with other people?         Family history, surgical history, past medical history, Allergies and med's reviewed with patient today and updated in [facility] EMR.     ROS:  Review of Systems   HENT: Positive for hoarse voice, sinus pressure and sneezing.    All other systems reviewed and are negative.      OBJECTIVE:  There were no vitals filed for this visit.  There is no height or weight on file to calculate BMI.  Physical Exam    ASSESSMENT/ PLAN:    Diagnoses and all orders for this visit:    1. Lymph node enlargement (Primary)  -     ciprofloxacin (Cipro) 500 MG tablet; Take 1 tablet by mouth 2 (Two) Times a Day for 7 days.  Dispense: 14 tablet; Refill: 0    2. Fatigue, unspecified type  -     ciprofloxacin (Cipro) 500 MG tablet; Take 1 tablet by mouth 2 (Two) Times a Day for 7 days.  Dispense: 14 tablet; Refill: 0    3. Mixed hyperlipidemia  -     Comprehensive Metabolic Panel; Future  -     Lipid Panel; Future  -     CBC & Differential; Future    4. Vitamin D deficiency  -     Vitamin D 25 Hydroxy; Future    5. Vitamin B12 deficiency  -     Vitamin B12; Future    6. Hypothyroidism, unspecified type  -     TSH; Future  -     T4, Free; Future    7. Type 2 diabetes mellitus with  hyperglycemia, without long-term current use of insulin (CMS/[facility])  -     Hemoglobin A1c; Future        Orders Placed Today:     New Medications Ordered This Visit   Medications   • ciprofloxacin (Cipro) 500 MG tablet     Sig: Take 1 tablet by mouth 2 (Two) Times a Day for 7 days.     Dispense:  14 tablet     Refill:  0        Management Plan:   Reviewed chart  Needs labs will come in next week  Start ABX if does not improved contact office  If swelling remains presents under neck after treatment will need ENT referral and U/S pt is agreeable.  Spent 8 minutes discussing plan of care    An After Visit Summary was printed and given to the patient at discharge.    Follow-up: Return in about 3 months (around 1/28/2021), or next week for labs.    Haydee Anderson, TYLER 10/29/2020 08:44 EDT  This note was electronically signed.

## 2020-11-03 DIAGNOSIS — R53.83 FATIGUE, UNSPECIFIED TYPE: ICD-10-CM

## 2020-11-03 DIAGNOSIS — R59.9 LYMPH NODE ENLARGEMENT: ICD-10-CM

## 2020-11-03 RX ORDER — CIPROFLOXACIN 500 MG/1
500 TABLET, FILM COATED ORAL 2 TIMES DAILY
Qty: 14 TABLET | Refills: 0 | OUTPATIENT
Start: 2020-11-03 | End: 2020-11-10

## 2020-11-03 RX ORDER — CIPROFLOXACIN 500 MG/1
TABLET, FILM COATED ORAL
Qty: 20 TABLET | OUTPATIENT
Start: 2020-11-03

## 2020-11-03 RX ORDER — CIPROFLOXACIN 500 MG/1
500 TABLET, FILM COATED ORAL 2 TIMES DAILY
Qty: 14 TABLET | Refills: 0 | Status: SHIPPED | OUTPATIENT
Start: 2020-11-03 | End: 2020-11-10

## 2020-11-03 NOTE — TELEPHONE ENCOUNTER
Was inadvertently sent to mail order pharmacy when it should have been sent to local pharmacy. Med is loaded and ready to send.

## 2020-11-05 ENCOUNTER — TELEPHONE (OUTPATIENT)
Dept: FAMILY MEDICINE CLINIC | Facility: CLINIC | Age: 60
End: 2020-11-05

## 2020-11-06 DIAGNOSIS — R53.83 FATIGUE, UNSPECIFIED TYPE: ICD-10-CM

## 2020-11-06 DIAGNOSIS — J44.1 COPD WITH EXACERBATION (HCC): Primary | ICD-10-CM

## 2020-11-10 RX ORDER — PROMETHAZINE HYDROCHLORIDE 25 MG/1
TABLET ORAL
Qty: 30 TABLET | Refills: 0 | Status: SHIPPED | OUTPATIENT
Start: 2020-11-10 | End: 2020-12-01 | Stop reason: SDUPTHER

## 2020-11-10 NOTE — TELEPHONE ENCOUNTER
Date of last refill: 10-    Is there an Inspect on file: N/A    Last appt date: 10-     Next appt date: 1-      Additional information:

## 2020-11-30 ENCOUNTER — TELEPHONE (OUTPATIENT)
Dept: FAMILY MEDICINE CLINIC | Facility: CLINIC | Age: 60
End: 2020-11-30

## 2020-11-30 NOTE — TELEPHONE ENCOUNTER
Patient called and stated that she needs new RX for promethazine (PHENERGAN) 25 MG tablet. Patient takes it twice a day and would like the instructions to state that.     Patient also needs refill for metoprolol succinate XL (TOPROL-XL) 50 MG 24 hr tablet      Please advise   [phone]    [facility] [address] [place] [address] - [facility] - [phone]  - [phone] FX

## 2020-12-01 RX ORDER — METOPROLOL SUCCINATE 50 MG/1
50 TABLET, EXTENDED RELEASE ORAL DAILY
Qty: 90 TABLET | Refills: 0 | Status: SHIPPED | OUTPATIENT
Start: 2020-12-01 | End: 2021-01-13

## 2020-12-01 RX ORDER — PROMETHAZINE HYDROCHLORIDE 25 MG/1
25 TABLET ORAL 2 TIMES DAILY PRN
Qty: 30 TABLET | Refills: 0 | Status: SHIPPED | OUTPATIENT
Start: 2020-12-01 | End: 2020-12-07 | Stop reason: SDUPTHER

## 2020-12-07 ENCOUNTER — TELEMEDICINE (OUTPATIENT)
Dept: FAMILY MEDICINE CLINIC | Facility: CLINIC | Age: 60
End: 2020-12-07

## 2020-12-07 DIAGNOSIS — J44.1 COPD WITH EXACERBATION (HCC): Primary | ICD-10-CM

## 2020-12-07 PROCEDURE — 99442 PR PHYS/QHP TELEPHONE EVALUATION 11-20 MIN: CPT | Performed by: NURSE PRACTITIONER

## 2020-12-07 RX ORDER — CEPHALEXIN 500 MG/1
500 CAPSULE ORAL 3 TIMES DAILY
COMMUNITY
Start: 2020-09-08 | End: 2020-12-23

## 2020-12-07 RX ORDER — IPRATROPIUM BROMIDE AND ALBUTEROL SULFATE 2.5; .5 MG/3ML; MG/3ML
3 SOLUTION RESPIRATORY (INHALATION) 4 TIMES DAILY PRN
Qty: 360 ML | Refills: 0 | Status: SHIPPED | OUTPATIENT
Start: 2020-12-07

## 2020-12-07 RX ORDER — PROMETHAZINE HYDROCHLORIDE 25 MG/1
25 TABLET ORAL 2 TIMES DAILY PRN
Qty: 60 TABLET | Refills: 1 | Status: SHIPPED | OUTPATIENT
Start: 2020-12-07 | End: 2021-01-11 | Stop reason: SDUPTHER

## 2020-12-07 RX ORDER — NYSTATIN 100000 [USP'U]/G
1 POWDER TOPICAL 3 TIMES DAILY PRN
Qty: 60 G | Refills: 0 | Status: SHIPPED | OUTPATIENT
Start: 2020-12-07 | End: 2021-03-04 | Stop reason: SDUPTHER

## 2020-12-07 NOTE — PROGRESS NOTES
Chief Complaint   Patient presents with   • Shortness of Breath     had leftover keflex and started herself on keflex tid    • Diarrhea   Subjective   An Harmon is a 60 y.o.  female who presents today for   3 days of illness  Shortness of air  Diarrhea  Has sarcoidosis of the lungs has not around anyone with covid  Reports that she is feeling better today         An Harmon  has a past medical history of Allergic, Anxiety, Arthritis, Asthma, Depression, GERD (gastroesophageal reflux disease), Hypertension, Kyphosis deformity of spine, Low back pain, Obesity, Osteopenia, and Scoliosis deformity of spine.    Allergies   Allergen Reactions   • Adhesive Tape Rash     tapes     • Amoxicillin-Pot Clavulanate Rash   • Cetirizine Palpitations   • Cimetidine Palpitations   • Clindamycin Rash   • Codeine GI Intolerance   • Doxycycline Rash   • Erythromycin Rash   • Hydrocodone-Acetaminophen GI Intolerance   • Methadone Palpitations   • Norgesic Forte  [Orphenadrine-Aspirin-Caffeine] Rash   • Nsaids GI Intolerance     Severe reaction     • Piroxicam Rash   • Prednisone Palpitations   • Aspirin GI Intolerance   • Hydrocodone Bitartrate Er GI Intolerance   • Ibuprofen GI Intolerance   • Oxycodone-Aspirin GI Intolerance   • Caffeine Palpitations   • Clavulanic Acid Rash   • Methylprednisolone Palpitations   • Penicillins Rash   • Sulfamethoxazole-Trimethoprim Rash   • Terfenadine Rash       Current Outpatient Medications:   •  albuterol sulfate  (90 Base) MCG/ACT inhaler, Inhale 2 puffs Every 6 (Six) Hours As Needed., Disp: , Rfl:   •  amitriptyline (ELAVIL) 75 MG tablet, Take 1 tablet by mouth Daily., Disp: 90 tablet, Rfl: 1  •  baclofen (LIORESAL) 20 MG tablet, Take 1 tablet by mouth 4 (Four) Times a Day., Disp: 120 tablet, Rfl: 3  •  busPIRone (BUSPAR) 10 MG tablet, Take 1 tablet by mouth 3 (Three) Times a Day. 1 tab po TID. MUST MAKE APPT TO RECEIVE FURTHER REFILLS. THANK YOU. (Patient taking  differently: Take 10 mg by mouth 3 (Three) Times a Day.), Disp: 90 tablet, Rfl: 0  •  cephalexin (KEFLEX) 500 MG capsule, Take 500 mg by mouth 3 (Three) Times a Day., Disp: , Rfl:   •  desonide (DESOWEN) 0.05 % cream, Apply 1 application topically to the appropriate area as directed 2 (Two) Times a Day As Needed., Disp: , Rfl:   •  doxepin (SINEquan) 25 MG capsule, Take 1 capsule by mouth Daily., Disp: 90 capsule, Rfl: 0  •  DULoxetine (CYMBALTA) 60 MG capsule, Take 1 capsule by mouth Daily., Disp: 90 capsule, Rfl: 0  •  esomeprazole (nexIUM) 40 MG capsule, Take 1 capsule by mouth 2 (two) times a day., Disp: 180 capsule, Rfl: 0  •  fexofenadine (ALLEGRA ALLERGY) 60 MG tablet, Take 60 mg by mouth Daily., Disp: , Rfl:   •  gabapentin (NEURONTIN) 800 MG tablet, Take 1 tablet by mouth 4 (Four) Times a Day., Disp: 120 tablet, Rfl: 0  •  hydrOXYzine (ATARAX) 25 MG tablet, TAKE 1 TABLET THREE TIMES DAILY, Disp: 270 tablet, Rfl: 0  •  ipratropium-albuterol (DUO-NEB) 0.5-2.5 mg/3 ml nebulizer, Take 3 mL by nebulization 4 (Four) Times a Day As Needed for Wheezing or Shortness of Air., Disp: 360 mL, Rfl: 0  •  metoprolol succinate XL (TOPROL-XL) 50 MG 24 hr tablet, Take 1 tablet by mouth Daily., Disp: 90 tablet, Rfl: 0  •  mometasone (NASONEX) 50 MCG/ACT nasal spray, 2 sprays into the nostril(s) as directed by provider Daily., Disp: , Rfl:   •  montelukast (SINGULAIR) 10 MG tablet, Take 1 tablet by mouth Daily., Disp: 90 tablet, Rfl: 0  •  nystatin (MYCOSTATIN) 105234 UNIT/GM powder, Apply 1 application topically to the appropriate area as directed 3 (Three) Times a Day As Needed (RASH)., Disp: 60 g, Rfl: 0  •  oxyCODONE-acetaminophen (PERCOCET)  MG per tablet, Take 1 tablet by mouth Every 6 (Six) Hours As Needed for Moderate Pain  or Severe Pain ., Disp: , Rfl:   •  polyethylene glycol (MIRALAX) powder, Take 17 g by mouth Daily. MIX WITH 8 OZS OF FLUID OF CHOICE EXCEPT MILK., Disp: , Rfl:   •  Prenatal Vit-Fe  Fumarate-FA (TRINATAL RX 1) 60-1 MG tablet, Take 1 tablet by mouth Daily., Disp: 90 tablet, Rfl: 1  •  promethazine (PHENERGAN) 25 MG tablet, Take 1 tablet by mouth 2 (Two) Times a Day As Needed for Nausea or Vomiting., Disp: 60 tablet, Rfl: 1  •  sucralfate (CARAFATE) 1 g tablet, Take 1 tablet by mouth 3 (Three) Times a Day., Disp: 270 tablet, Rfl: 0  •  umeclidinium-vilanterol (ANORO ELLIPTA) 62.5-25 MCG/INH aerosol powder  inhaler, Inhale 1 puff Daily., Disp: 60 each, Rfl: 1  Past Medical History:   Diagnosis Date   • Allergic    • Anxiety    • Arthritis    • Asthma    • Depression    • GERD (gastroesophageal reflux disease)    • Hypertension    • Kyphosis deformity of spine    • Low back pain    • Obesity    • Osteopenia    • Scoliosis deformity of spine      Past Surgical History:   Procedure Laterality Date   • APPENDECTOMY       Social History     Socioeconomic History   • Marital status:      Spouse name: Not on file   • Number of children: Not on file   • Years of education: Not on file   • Highest education level: Not on file   Tobacco Use   • Smoking status: Never Smoker   • Smokeless tobacco: Never Used   Substance and Sexual Activity   • Alcohol use: No     Frequency: Never   • Drug use: No     Family History   Problem Relation Age of Onset   • Cancer Mother    • Diabetes Mother    • Heart disease Mother    • Cancer Father    • COPD Father    • Cancer Sister      PHQ-2 Depression Screening  Little interest or pleasure in doing things?     Feeling down, depressed, or hopeless?     PHQ-2 Total Score       PHQ-9 Depression Screening  Little interest or pleasure in doing things?     Feeling down, depressed, or hopeless?     Trouble falling or staying asleep, or sleeping too much?     Feeling tired or having little energy?     Poor appetite or overeating?     Feeling bad about yourself - or that you are a failure or have let yourself or your family down?     Trouble concentrating on things, such as  reading the newspaper or watching television?     Moving or speaking so slowly that other people could have noticed? Or the opposite - being so fidgety or restless that you have been moving around a lot more than usual?     Thoughts that you would be better off dead, or of hurting yourself in some way?     PHQ-9 Total Score     If you checked off any problems, how difficult have these problems made it for you to do your work, take care of things at home, or get along with other people?         Family history, surgical history, past medical history, Allergies and med's reviewed with patient today and updated in [facility] EMR.     ROS:  Review of Systems   Constitutional: Positive for fever.   Gastrointestinal: Positive for diarrhea.   All other systems reviewed and are negative.      OBJECTIVE:  There were no vitals filed for this visit.  There is no height or weight on file to calculate BMI.  Physical Exam    ASSESSMENT/ PLAN:    Diagnoses and all orders for this visit:    1. COPD with exacerbation (CMS/[facility]) (Primary)  -     ipratropium-albuterol (DUO-NEB) 0.5-2.5 mg/3 ml nebulizer; Take 3 mL by nebulization 4 (Four) Times a Day As Needed for Wheezing or Shortness of Air.  Dispense: 360 mL; Refill: 0    Other orders  -     umeclidinium-vilanterol (ANORO ELLIPTA) 62.5-25 MCG/INH aerosol powder  inhaler; Inhale 1 puff Daily.  Dispense: 60 each; Refill: 1  -     promethazine (PHENERGAN) 25 MG tablet; Take 1 tablet by mouth 2 (Two) Times a Day As Needed for Nausea or Vomiting.  Dispense: 60 tablet; Refill: 1  -     nystatin (MYCOSTATIN) 016332 UNIT/GM powder; Apply 1 application topically to the appropriate area as directed 3 (Three) Times a Day As Needed (RASH).  Dispense: 60 g; Refill: 0        Orders Placed Today:     New Medications Ordered This Visit   Medications   • umeclidinium-vilanterol (ANORO ELLIPTA) 62.5-25 MCG/INH aerosol powder  inhaler     Sig: Inhale 1 puff Daily.     Dispense:  60 each     Refill:  1   •  promethazine (PHENERGAN) 25 MG tablet     Sig: Take 1 tablet by mouth 2 (Two) Times a Day As Needed for Nausea or Vomiting.     Dispense:  60 tablet     Refill:  1   • nystatin (MYCOSTATIN) 997598 UNIT/GM powder     Sig: Apply 1 application topically to the appropriate area as directed 3 (Three) Times a Day As Needed (RASH).     Dispense:  60 g     Refill:  0   • ipratropium-albuterol (DUO-NEB) 0.5-2.5 mg/3 ml nebulizer     Sig: Take 3 mL by nebulization 4 (Four) Times a Day As Needed for Wheezing or Shortness of Air.     Dispense:  360 mL     Refill:  0    You have chosen to receive care through a telephone visit. Do you consent to use a telephone visit for your medical care today? Yes    I spent 12 minutes discussing plan of care with patient       Management Plan:   Pt instructed to go the ER is symptoms return  Feels improved today  Will call patient back Justo  Increase fluids gatorade or water   Tylenol or ibuprofen for the pain    An After Visit Summary was printed and given to the patient at discharge.    Follow-up: No follow-ups on file.    Haydee Anderson, APRN 12/8/2020 10:20 EST  This note was electronically signed.

## 2020-12-11 DIAGNOSIS — F41.9 ANXIETY: ICD-10-CM

## 2020-12-11 RX ORDER — HYDROXYZINE HYDROCHLORIDE 25 MG/1
25 TABLET, FILM COATED ORAL 3 TIMES DAILY
Qty: 30 TABLET | Refills: 0 | Status: SHIPPED | OUTPATIENT
Start: 2020-12-11 | End: 2020-12-28 | Stop reason: SDUPTHER

## 2020-12-11 RX ORDER — BUSPIRONE HYDROCHLORIDE 10 MG/1
10 TABLET ORAL 3 TIMES DAILY
Qty: 30 TABLET | Refills: 0 | Status: SHIPPED | OUTPATIENT
Start: 2020-12-11 | End: 2020-12-14 | Stop reason: SDUPTHER

## 2020-12-11 RX ORDER — BACLOFEN 20 MG/1
20 TABLET ORAL 4 TIMES DAILY
Qty: 120 TABLET | Refills: 0 | Status: SHIPPED | OUTPATIENT
Start: 2020-12-11 | End: 2020-12-14 | Stop reason: SDUPTHER

## 2020-12-11 RX ORDER — MONTELUKAST SODIUM 10 MG/1
10 TABLET ORAL DAILY
Qty: 90 TABLET | Refills: 0 | Status: SHIPPED | OUTPATIENT
Start: 2020-12-11 | End: 2021-01-29 | Stop reason: SDUPTHER

## 2020-12-13 ENCOUNTER — TELEPHONE (OUTPATIENT)
Dept: FAMILY MEDICINE CLINIC | Facility: CLINIC | Age: 60
End: 2020-12-13

## 2020-12-14 DIAGNOSIS — F41.9 ANXIETY: ICD-10-CM

## 2020-12-14 RX ORDER — BACLOFEN 20 MG/1
20 TABLET ORAL 4 TIMES DAILY
Qty: 120 TABLET | Refills: 0 | Status: SHIPPED | OUTPATIENT
Start: 2020-12-14 | End: 2021-01-11 | Stop reason: SDUPTHER

## 2020-12-14 RX ORDER — BUSPIRONE HYDROCHLORIDE 10 MG/1
10 TABLET ORAL 3 TIMES DAILY
Qty: 30 TABLET | Refills: 0 | Status: SHIPPED | OUTPATIENT
Start: 2020-12-14 | End: 2020-12-28

## 2020-12-14 RX ORDER — PRENATAL VIT 27,CALC/IRON/FA 60 MG-1 MG
1 TABLET ORAL DAILY
Qty: 90 TABLET | Refills: 1 | Status: SHIPPED | OUTPATIENT
Start: 2020-12-14 | End: 2021-01-28

## 2020-12-14 NOTE — TELEPHONE ENCOUNTER
Date of last refill: 3-    Is there an Inspect on file: N/A     Last appt date: 10-     Next appt date: 1-      Additional information:

## 2020-12-14 NOTE — TELEPHONE ENCOUNTER
Caller: An Harmon    Relationship: Self    Best call back number: [phone]    Medication needed:   Requested Prescriptions     Pending Prescriptions Disp Refills   • Prenatal Vit-Fe Fumarate-FA (Trinatal Rx 1) 60-1 MG tablet 90 tablet 1     Sig: Take 1 tablet by mouth Daily.       When do you need the refill by: ASAP    What details did the patient provide when requesting the medication: Patient is out of this medication and requesting just a one month supply due to insurance changing soon.    Does the patient have less than a 3 day supply:  [x] Yes  [] No    What is the patient's preferred pharmacy: [facility] [address] [place] [address] [phone] [facility] [phone] FX

## 2020-12-17 RX ORDER — GABAPENTIN 800 MG/1
800 TABLET ORAL 4 TIMES DAILY
Qty: 120 TABLET | Refills: 0 | Status: SHIPPED | OUTPATIENT
Start: 2020-12-17 | End: 2021-01-13

## 2020-12-17 NOTE — TELEPHONE ENCOUNTER
Date of last refill: 9-3-2020    Is there an Inspect on file: YES- UPDATED ON 12- AND IS SCANNED INTO CHART     Last appt date: 12-7-2020     Next appt date: 1-      Additional information:

## 2020-12-18 ENCOUNTER — TELEPHONE (OUTPATIENT)
Dept: FAMILY MEDICINE CLINIC | Facility: CLINIC | Age: 60
End: 2020-12-18

## 2020-12-18 NOTE — TELEPHONE ENCOUNTER
PATIENT CALLED AND STATES THAT SHE HAD BEEN SICK AT HER STOMACH AND TAKING STOMACH MEDICINE, PLUS DIZZINESS.   PATIENT BELIEVES THAT HER ISSUES ARE DUE TO HER WATER SUPPLY, BECAUSE HER ICE SMELT REALLY REALLY BAD AND SHE HAS CHANGED FILTERS SEVERAL TIMES.  NOW SHE HAS TO URINATE FREQUENTLY. DO YOU THINK THAT THIS IS WHY SHE FEELS SO BAD?    PLEASE ADVISE:  [phone]

## 2020-12-21 NOTE — TELEPHONE ENCOUNTER
Im uncertain if that could be the cause  We can do a phone visit tomorrow if she like if she can't come into the office for evaluation  She has been doing only phone visit since Covid

## 2020-12-23 ENCOUNTER — OFFICE VISIT (OUTPATIENT)
Dept: FAMILY MEDICINE CLINIC | Facility: CLINIC | Age: 60
End: 2020-12-23

## 2020-12-23 VITALS
HEIGHT: 63 IN | HEART RATE: 83 BPM | WEIGHT: 227 LBS | OXYGEN SATURATION: 94 % | BODY MASS INDEX: 40.22 KG/M2 | DIASTOLIC BLOOD PRESSURE: 82 MMHG | SYSTOLIC BLOOD PRESSURE: 139 MMHG | TEMPERATURE: 98 F

## 2020-12-23 DIAGNOSIS — R60.0 LOCALIZED EDEMA: ICD-10-CM

## 2020-12-23 DIAGNOSIS — J44.1 COPD WITH EXACERBATION (HCC): Primary | ICD-10-CM

## 2020-12-23 DIAGNOSIS — E55.9 VITAMIN D DEFICIENCY: ICD-10-CM

## 2020-12-23 DIAGNOSIS — E78.00 PURE HYPERCHOLESTEROLEMIA: ICD-10-CM

## 2020-12-23 DIAGNOSIS — R53.83 FATIGUE, UNSPECIFIED TYPE: ICD-10-CM

## 2020-12-23 DIAGNOSIS — I13.0 HYPERTENSIVE HEART AND CHRONIC KIDNEY DISEASE WITH HEART FAILURE AND STAGE 1 THROUGH STAGE 4 CHRONIC KIDNEY DISEASE, OR UNSPECIFIED CHRONIC KIDNEY DISEASE (HCC): ICD-10-CM

## 2020-12-23 DIAGNOSIS — E53.8 VITAMIN B12 DEFICIENCY: ICD-10-CM

## 2020-12-23 DIAGNOSIS — E03.9 HYPOTHYROIDISM, UNSPECIFIED TYPE: ICD-10-CM

## 2020-12-23 DIAGNOSIS — J06.9 VIRAL UPPER RESPIRATORY TRACT INFECTION: ICD-10-CM

## 2020-12-23 DIAGNOSIS — E11.65 TYPE 2 DIABETES MELLITUS WITH HYPERGLYCEMIA, WITHOUT LONG-TERM CURRENT USE OF INSULIN (HCC): ICD-10-CM

## 2020-12-23 PROCEDURE — U0003 INFECTIOUS AGENT DETECTION BY NUCLEIC ACID (DNA OR RNA); SEVERE ACUTE RESPIRATORY SYNDROME CORONAVIRUS 2 (SARS-COV-2) (CORONAVIRUS DISEASE [COVID-19]), AMPLIFIED PROBE TECHNIQUE, MAKING USE OF HIGH THROUGHPUT TECHNOLOGIES AS DESCRIBED BY CMS-2020-01-R: HCPCS | Performed by: NURSE PRACTITIONER

## 2020-12-23 PROCEDURE — 99213 OFFICE O/P EST LOW 20 MIN: CPT | Performed by: NURSE PRACTITIONER

## 2020-12-23 RX ORDER — CEPHALEXIN 500 MG/1
500 CAPSULE ORAL 3 TIMES DAILY
Qty: 30 CAPSULE | Refills: 0 | Status: SHIPPED | OUTPATIENT
Start: 2020-12-23 | End: 2021-01-29 | Stop reason: SDUPTHER

## 2020-12-25 LAB — SARS-COV-2 RNA RESP QL NAA+PROBE: NOT DETECTED

## 2020-12-27 DIAGNOSIS — F41.9 ANXIETY: ICD-10-CM

## 2020-12-28 ENCOUNTER — TELEPHONE (OUTPATIENT)
Dept: FAMILY MEDICINE CLINIC | Facility: CLINIC | Age: 60
End: 2020-12-28

## 2020-12-28 RX ORDER — HYDROXYZINE HYDROCHLORIDE 25 MG/1
25 TABLET, FILM COATED ORAL 3 TIMES DAILY
Qty: 30 TABLET | Refills: 0 | Status: SHIPPED | OUTPATIENT
Start: 2020-12-28 | End: 2021-01-11 | Stop reason: SDUPTHER

## 2020-12-28 RX ORDER — BUSPIRONE HYDROCHLORIDE 10 MG/1
10 TABLET ORAL 3 TIMES DAILY
Qty: 270 TABLET | Refills: 0 | Status: SHIPPED | OUTPATIENT
Start: 2020-12-28 | End: 2021-01-29 | Stop reason: SDUPTHER

## 2020-12-28 NOTE — TELEPHONE ENCOUNTER
Caller: An Harmon    Relationship: Self    Best call back number:[phone]    Medication needed:   Requested Prescriptions     Pending Prescriptions Disp Refills   • busPIRone (BUSPAR) 10 MG tablet [Pharmacy Med Name: busPIRone HCl 10 MG Oral Tablet] 270 tablet 0     Sig: Take 1 tablet by mouth 3 (Three) Times a Day.   • hydrOXYzine (ATARAX) 25 MG tablet 30 tablet 0     Sig: Take 1 tablet by mouth 3 (Three) Times a Day for 30 days.       When do you need the refill by: 12/28/20    What details did the patient provide when requesting the medication:  COMPLETELY OUT OF MEDICATION    Does the patient have less than a 3 day supply:  [x] Yes  [] No    What is the patient's preferred pharmacy: [address] [place]  [phone] [facility] [phone]

## 2020-12-28 NOTE — TELEPHONE ENCOUNTER
Date of last refill:    Is there an Inspect on file: N/A     Last appt date:     Next appt date:      Additional information:

## 2021-01-02 DIAGNOSIS — F41.9 ANXIETY: ICD-10-CM

## 2021-01-04 ENCOUNTER — TELEPHONE (OUTPATIENT)
Dept: FAMILY MEDICINE CLINIC | Facility: CLINIC | Age: 61
End: 2021-01-04

## 2021-01-04 RX ORDER — BUSPIRONE HYDROCHLORIDE 10 MG/1
TABLET ORAL
Qty: 270 TABLET | Refills: 0 | OUTPATIENT
Start: 2021-01-04

## 2021-01-04 RX ORDER — PROMETHAZINE HYDROCHLORIDE 25 MG/1
25 TABLET ORAL 2 TIMES DAILY PRN
Qty: 60 TABLET | Refills: 1 | Status: CANCELLED | OUTPATIENT
Start: 2021-01-04

## 2021-01-04 RX ORDER — BACLOFEN 20 MG/1
20 TABLET ORAL 4 TIMES DAILY
Qty: 120 TABLET | Refills: 0 | Status: CANCELLED | OUTPATIENT
Start: 2021-01-04 | End: 2021-02-03

## 2021-01-04 RX ORDER — HYDROXYZINE HYDROCHLORIDE 25 MG/1
25 TABLET, FILM COATED ORAL 3 TIMES DAILY
Qty: 30 TABLET | Refills: 0 | Status: CANCELLED | OUTPATIENT
Start: 2021-01-04 | End: 2021-02-03

## 2021-01-04 RX ORDER — HYDROXYZINE HYDROCHLORIDE 25 MG/1
TABLET, FILM COATED ORAL
Qty: 30 TABLET | Refills: 0 | OUTPATIENT
Start: 2021-01-04

## 2021-01-04 NOTE — TELEPHONE ENCOUNTER
Caller: An Harmon    Relationship: Self    Best call back number: [phone]    Medication needed:   Requested Prescriptions     Pending Prescriptions Disp Refills   • hydrOXYzine (ATARAX) 25 MG tablet 30 tablet 0     Sig: Take 1 tablet by mouth 3 (Three) Times a Day for 30 days.   • promethazine (PHENERGAN) 25 MG tablet 60 tablet 1     Sig: Take 1 tablet by mouth 2 (Two) Times a Day As Needed for Nausea or Vomiting.   • baclofen (LIORESAL) 20 MG tablet 120 tablet 0     Sig: Take 1 tablet by mouth 4 (Four) Times a Day for 30 days.       When do you need the refill by: 01/07/21    What details did the patient provide when requesting the medication: PATIENT NEEDS CALLBACK IF MEDICATIONS CANNOT BE REFILLED    Does the patient have less than a 3 day supply:  [] Yes  [x] No    What is the patient's preferred pharmacy: [address] [place]  [phone] [place] [phone] FX

## 2021-01-08 ENCOUNTER — TELEPHONE (OUTPATIENT)
Dept: FAMILY MEDICINE CLINIC | Facility: CLINIC | Age: 61
End: 2021-01-08

## 2021-01-08 RX ORDER — PROMETHAZINE HYDROCHLORIDE 25 MG/1
25 TABLET ORAL 2 TIMES DAILY PRN
Qty: 60 TABLET | Refills: 1 | Status: CANCELLED | OUTPATIENT
Start: 2021-01-08

## 2021-01-08 RX ORDER — BACLOFEN 20 MG/1
20 TABLET ORAL 4 TIMES DAILY
Qty: 120 TABLET | Refills: 0 | Status: CANCELLED | OUTPATIENT
Start: 2021-01-08 | End: 2021-02-07

## 2021-01-08 RX ORDER — HYDROXYZINE HYDROCHLORIDE 25 MG/1
25 TABLET, FILM COATED ORAL 3 TIMES DAILY
Qty: 30 TABLET | Refills: 0 | Status: CANCELLED | OUTPATIENT
Start: 2021-01-08 | End: 2021-02-07

## 2021-01-08 NOTE — TELEPHONE ENCOUNTER
Caller: An Harmon    Relationship: Self    Best call back number: [phone]    Medication needed:   hydrOXYzine (ATARAX) 25 MG tablet  baclofen (LIORESAL) 20 MG tablet  promethazine (PHENERGAN) 25 MG tablet    When do you need the refill by: ASAP     What details did the patient provide when requesting the medication: Patient is almost out    Does the patient have less than a 3 day supply:  [x] Yes  [] No    What is the patient's preferred pharmacy:  [facility] - [facility] [address] [facility] - [phone]  - [phone]   [phone]

## 2021-01-09 DIAGNOSIS — F41.9 ANXIETY: ICD-10-CM

## 2021-01-11 RX ORDER — MONTELUKAST SODIUM 10 MG/1
TABLET ORAL
Qty: 90 TABLET | Refills: 0 | OUTPATIENT
Start: 2021-01-11

## 2021-01-11 RX ORDER — BACLOFEN 20 MG/1
20 TABLET ORAL 4 TIMES DAILY
Qty: 120 TABLET | Refills: 0 | Status: SHIPPED | OUTPATIENT
Start: 2021-01-11 | End: 2021-02-04 | Stop reason: SDUPTHER

## 2021-01-11 RX ORDER — PROMETHAZINE HYDROCHLORIDE 25 MG/1
25 TABLET ORAL 2 TIMES DAILY PRN
Qty: 30 TABLET | Refills: 0 | Status: SHIPPED | OUTPATIENT
Start: 2021-01-11 | End: 2021-01-13 | Stop reason: SDUPTHER

## 2021-01-11 RX ORDER — HYDROXYZINE HYDROCHLORIDE 25 MG/1
TABLET, FILM COATED ORAL
Qty: 30 TABLET | Refills: 0 | OUTPATIENT
Start: 2021-01-11

## 2021-01-11 RX ORDER — HYDROXYZINE HYDROCHLORIDE 25 MG/1
25 TABLET, FILM COATED ORAL 3 TIMES DAILY
Qty: 90 TABLET | Refills: 0 | Status: SHIPPED | OUTPATIENT
Start: 2021-01-11 | End: 2021-01-13 | Stop reason: SDUPTHER

## 2021-01-11 RX ORDER — BUSPIRONE HYDROCHLORIDE 10 MG/1
TABLET ORAL
Qty: 270 TABLET | Refills: 0 | OUTPATIENT
Start: 2021-01-11

## 2021-01-12 DIAGNOSIS — R53.83 FATIGUE, UNSPECIFIED TYPE: ICD-10-CM

## 2021-01-12 DIAGNOSIS — R59.9 LYMPH NODE ENLARGEMENT: ICD-10-CM

## 2021-01-12 DIAGNOSIS — F41.9 ANXIETY: ICD-10-CM

## 2021-01-12 RX ORDER — DULOXETIN HYDROCHLORIDE 60 MG/1
60 CAPSULE, DELAYED RELEASE ORAL DAILY
Qty: 90 CAPSULE | Refills: 0 | Status: CANCELLED | OUTPATIENT
Start: 2021-01-12

## 2021-01-12 RX ORDER — GABAPENTIN 800 MG/1
800 TABLET ORAL 4 TIMES DAILY
Qty: 120 TABLET | Refills: 0 | Status: CANCELLED | OUTPATIENT
Start: 2021-01-12

## 2021-01-12 RX ORDER — SUCRALFATE 1 G/1
1 TABLET ORAL 3 TIMES DAILY
Qty: 270 TABLET | Refills: 0 | Status: CANCELLED | OUTPATIENT
Start: 2021-01-12

## 2021-01-13 RX ORDER — SUCRALFATE 1 G/1
TABLET ORAL
Qty: 270 TABLET | Refills: 0 | OUTPATIENT
Start: 2021-01-13

## 2021-01-13 RX ORDER — MONTELUKAST SODIUM 10 MG/1
TABLET ORAL
Qty: 90 TABLET | Refills: 0 | OUTPATIENT
Start: 2021-01-13

## 2021-01-13 RX ORDER — GABAPENTIN 800 MG/1
TABLET ORAL
Qty: 360 TABLET | Refills: 1 | Status: SHIPPED | OUTPATIENT
Start: 2021-01-13 | End: 2021-05-17

## 2021-01-13 RX ORDER — DULOXETIN HYDROCHLORIDE 60 MG/1
CAPSULE, DELAYED RELEASE ORAL
Qty: 90 CAPSULE | Refills: 0 | OUTPATIENT
Start: 2021-01-13

## 2021-01-13 RX ORDER — BUSPIRONE HYDROCHLORIDE 10 MG/1
TABLET ORAL
Qty: 30 TABLET | OUTPATIENT
Start: 2021-01-13

## 2021-01-13 RX ORDER — ESOMEPRAZOLE MAGNESIUM 40 MG/1
CAPSULE, DELAYED RELEASE ORAL
Qty: 180 CAPSULE | Refills: 1 | Status: SHIPPED | OUTPATIENT
Start: 2021-01-13 | End: 2021-02-04 | Stop reason: SDUPTHER

## 2021-01-13 RX ORDER — CIPROFLOXACIN 500 MG/1
500 TABLET, FILM COATED ORAL 2 TIMES DAILY
Qty: 14 TABLET | Refills: 0 | OUTPATIENT
Start: 2021-01-13 | End: 2021-01-20

## 2021-01-13 RX ORDER — DOXEPIN HYDROCHLORIDE 25 MG/1
CAPSULE ORAL
Qty: 90 CAPSULE | Refills: 0 | OUTPATIENT
Start: 2021-01-13

## 2021-01-13 RX ORDER — METOPROLOL SUCCINATE 50 MG/1
TABLET, EXTENDED RELEASE ORAL
Qty: 90 TABLET | Refills: 1 | Status: SHIPPED | OUTPATIENT
Start: 2021-01-13 | End: 2021-06-01 | Stop reason: SDUPTHER

## 2021-01-13 NOTE — TELEPHONE ENCOUNTER
.    Caller: An Harmon    Relationship: Self    Best call back number: [phone] (H)    Medication needed:   Requested Prescriptions     Pending Prescriptions Disp Refills   • promethazine (PHENERGAN) 25 MG tablet 30 tablet 0     Sig: Take 1 tablet by mouth 2 (Two) Times a Day As Needed for Nausea or Vomiting.   • hydrOXYzine (ATARAX) 25 MG tablet 90 tablet 0     Sig: Take 1 tablet by mouth 3 (Three) Times a Day for 30 days.       When do you need the refill by: ASAP    What details did the patient provide when requesting the medication:     Does the patient have less than a 3 day supply:  [] Yes  [] No    What is the patient's preferred pharmacy: [address] [place]  [phone] [facility] [phone] FX

## 2021-01-13 NOTE — TELEPHONE ENCOUNTER
Date of last refill:    GABAPENTIN: 12-  TOPROL: 12-1-2020  NEXIUM: 8-    Is there an Inspect on file: YES- UPDATED ON 1- AND IS SCANNED INTO CHART     Last appt date: 12-     Next appt date: 3-      Additional information:

## 2021-01-14 RX ORDER — HYDROXYZINE HYDROCHLORIDE 25 MG/1
25 TABLET, FILM COATED ORAL 3 TIMES DAILY
Qty: 90 TABLET | Refills: 0 | Status: SHIPPED | OUTPATIENT
Start: 2021-01-14 | End: 2021-01-15 | Stop reason: SDUPTHER

## 2021-01-14 RX ORDER — PROMETHAZINE HYDROCHLORIDE 25 MG/1
25 TABLET ORAL 2 TIMES DAILY PRN
Qty: 30 TABLET | Refills: 0 | Status: SHIPPED | OUTPATIENT
Start: 2021-01-14 | End: 2021-01-15 | Stop reason: SDUPTHER

## 2021-01-14 NOTE — TELEPHONE ENCOUNTER
PT CALLED TO FOLLOW UP ON THIS REQUEST. SHE STATES SHE HAS BEEN OUT FOR A FEW DAYS. SHE WOULD LIKE A CALL BACK WITH A STATUS UPDATE, AS SHE STATES SHE HAS BEEN TRYING TO GET THESE FOR SEVERAL DAYS.    PLEASE CALL BACK AND LET HER KNOW IF/WHEN THEY WILL BE SENT IN.    SHE CAN BE REACHED -[phone]

## 2021-01-15 RX ORDER — PROMETHAZINE HYDROCHLORIDE 25 MG/1
25 TABLET ORAL 2 TIMES DAILY PRN
Qty: 10 TABLET | Refills: 0 | Status: SHIPPED | OUTPATIENT
Start: 2021-01-15 | End: 2021-02-04 | Stop reason: SDUPTHER

## 2021-01-15 RX ORDER — HYDROXYZINE HYDROCHLORIDE 25 MG/1
25 TABLET, FILM COATED ORAL 3 TIMES DAILY
Qty: 30 TABLET | Refills: 0 | Status: SHIPPED | OUTPATIENT
Start: 2021-01-15 | End: 2021-02-04 | Stop reason: SDUPTHER

## 2021-01-25 ENCOUNTER — TELEPHONE (OUTPATIENT)
Dept: FAMILY MEDICINE CLINIC | Facility: CLINIC | Age: 61
End: 2021-01-25

## 2021-01-25 RX ORDER — DULOXETIN HYDROCHLORIDE 60 MG/1
60 CAPSULE, DELAYED RELEASE ORAL DAILY
Qty: 90 CAPSULE | Refills: 0 | Status: CANCELLED | OUTPATIENT
Start: 2021-01-25

## 2021-01-26 NOTE — TELEPHONE ENCOUNTER
Patient advised that [facility] is needing a new rx for amitriptyline (ELAVIL) 75 MG tablet, DULoxetine (CYMBALTA) 60 MG capsule, sucralfate (CARAFATE) 1 g tablet, and Prenatal Vit-Fe Fumarate-FA (Trinatal Rx 1) 60-1 MG tablet      Patient needs these sent to [facility] - [place], [place] - [address] [facility] - [phone]  - [phone] FX AND a short supply to hold her over until her mail delivery arrives sent to [facility] [address] [place] [address] - [address] - [phone]  - [phone] FX

## 2021-01-27 ENCOUNTER — TELEPHONE (OUTPATIENT)
Dept: FAMILY MEDICINE CLINIC | Facility: CLINIC | Age: 61
End: 2021-01-27

## 2021-01-28 RX ORDER — GUAIFENESIN 600 MG/1
600 TABLET, EXTENDED RELEASE ORAL 2 TIMES DAILY
Qty: 28 TABLET | Refills: 0 | Status: SHIPPED | OUTPATIENT
Start: 2021-01-28 | End: 2021-01-28 | Stop reason: SDUPTHER

## 2021-01-28 RX ORDER — GUAIFENESIN 600 MG/1
600 TABLET, EXTENDED RELEASE ORAL 2 TIMES DAILY
Qty: 28 TABLET | Refills: 0 | Status: SHIPPED | OUTPATIENT
Start: 2021-01-28 | End: 2021-02-11

## 2021-01-28 RX ORDER — DULOXETIN HYDROCHLORIDE 60 MG/1
60 CAPSULE, DELAYED RELEASE ORAL DAILY
Qty: 30 CAPSULE | Refills: 0 | Status: CANCELLED | OUTPATIENT
Start: 2021-01-28

## 2021-01-28 RX ORDER — PRENATAL VIT 27,CALC/IRON/FA 60 MG-1 MG
TABLET ORAL
Qty: 90 TABLET | Refills: 1 | Status: SHIPPED | OUTPATIENT
Start: 2021-01-28 | End: 2021-01-28 | Stop reason: SDUPTHER

## 2021-01-28 RX ORDER — DULOXETIN HYDROCHLORIDE 60 MG/1
60 CAPSULE, DELAYED RELEASE ORAL DAILY
Qty: 90 CAPSULE | Refills: 1 | Status: SHIPPED | OUTPATIENT
Start: 2021-01-28 | End: 2021-11-18 | Stop reason: SDUPTHER

## 2021-01-28 RX ORDER — SUCRALFATE 1 G/1
1 TABLET ORAL 3 TIMES DAILY
Qty: 90 TABLET | Refills: 0 | Status: CANCELLED | OUTPATIENT
Start: 2021-01-28

## 2021-01-28 RX ORDER — AMITRIPTYLINE HYDROCHLORIDE 75 MG/1
75 TABLET, FILM COATED ORAL EVERY 24 HOURS
Qty: 90 TABLET | Refills: 1 | Status: SHIPPED | OUTPATIENT
Start: 2021-01-28 | End: 2021-01-28 | Stop reason: SDUPTHER

## 2021-01-28 RX ORDER — PRENATAL VIT 27,CALC/IRON/FA 60 MG-1 MG
1 TABLET ORAL DAILY
Qty: 90 TABLET | Refills: 1 | Status: SHIPPED | OUTPATIENT
Start: 2021-01-28 | End: 2021-02-04 | Stop reason: SDUPTHER

## 2021-01-28 RX ORDER — SUCRALFATE 1 G/1
1 TABLET ORAL 3 TIMES DAILY
Qty: 270 TABLET | Refills: 0 | Status: SHIPPED | OUTPATIENT
Start: 2021-01-28 | End: 2021-05-27

## 2021-01-28 RX ORDER — DULOXETIN HYDROCHLORIDE 60 MG/1
CAPSULE, DELAYED RELEASE ORAL
Qty: 90 CAPSULE | Refills: 0 | Status: SHIPPED | OUTPATIENT
Start: 2021-01-28 | End: 2021-01-28 | Stop reason: SDUPTHER

## 2021-01-28 NOTE — TELEPHONE ENCOUNTER
Date of last refill:07/13/2020    Is there an Inspect on file:?    Last appt date:12/23/2020     Next appt date:03/24/2021      Additional information:NEEDS A 30 DAY SU[facility], AND THEN THE PENDED SEND TO [facility].

## 2021-01-29 DIAGNOSIS — F41.9 ANXIETY: ICD-10-CM

## 2021-01-29 RX ORDER — DOXEPIN HYDROCHLORIDE 25 MG/1
25 CAPSULE ORAL EVERY 24 HOURS
Qty: 90 CAPSULE | Refills: 0 | Status: SHIPPED | OUTPATIENT
Start: 2021-01-29 | End: 2021-04-07

## 2021-01-29 RX ORDER — DULOXETIN HYDROCHLORIDE 60 MG/1
60 CAPSULE, DELAYED RELEASE ORAL DAILY
Qty: 90 CAPSULE | Refills: 1 | Status: CANCELLED | OUTPATIENT
Start: 2021-01-29

## 2021-01-29 RX ORDER — MONTELUKAST SODIUM 10 MG/1
10 TABLET ORAL DAILY
Qty: 90 TABLET | Refills: 0 | Status: SHIPPED | OUTPATIENT
Start: 2021-01-29 | End: 2021-05-05 | Stop reason: SDUPTHER

## 2021-01-29 RX ORDER — BUSPIRONE HYDROCHLORIDE 10 MG/1
10 TABLET ORAL 3 TIMES DAILY
Qty: 270 TABLET | Refills: 0 | Status: SHIPPED | OUTPATIENT
Start: 2021-01-29 | End: 2021-05-28

## 2021-01-29 RX ORDER — CEPHALEXIN 500 MG/1
500 CAPSULE ORAL 3 TIMES DAILY
Qty: 30 CAPSULE | Refills: 0 | Status: SHIPPED | OUTPATIENT
Start: 2021-01-29 | End: 2021-03-03 | Stop reason: SDUPTHER

## 2021-01-29 NOTE — TELEPHONE ENCOUNTER
Date of last refill:12/28/2020    Is there an Inspect on file:N/A    Last appt date:12/23/2020     Next appt date:03/24/2021      Additional information:

## 2021-02-01 DIAGNOSIS — F41.9 ANXIETY: ICD-10-CM

## 2021-02-01 RX ORDER — PROMETHAZINE HYDROCHLORIDE 25 MG/1
25 TABLET ORAL 2 TIMES DAILY PRN
Qty: 10 TABLET | Refills: 0 | Status: CANCELLED | OUTPATIENT
Start: 2021-02-01

## 2021-02-01 RX ORDER — DOXEPIN HYDROCHLORIDE 25 MG/1
25 CAPSULE ORAL EVERY 24 HOURS
Qty: 90 CAPSULE | Refills: 0 | Status: CANCELLED | OUTPATIENT
Start: 2021-02-01

## 2021-02-01 RX ORDER — GABAPENTIN 800 MG/1
800 TABLET ORAL 4 TIMES DAILY
Qty: 360 TABLET | Refills: 1 | Status: CANCELLED | OUTPATIENT
Start: 2021-02-01

## 2021-02-01 RX ORDER — FEXOFENADINE HCL 60 MG/1
60 TABLET, FILM COATED ORAL DAILY
Status: CANCELLED | OUTPATIENT
Start: 2021-02-01

## 2021-02-01 RX ORDER — MONTELUKAST SODIUM 10 MG/1
10 TABLET ORAL DAILY
Qty: 90 TABLET | Refills: 0 | Status: CANCELLED | OUTPATIENT
Start: 2021-02-01

## 2021-02-01 RX ORDER — DULOXETIN HYDROCHLORIDE 60 MG/1
60 CAPSULE, DELAYED RELEASE ORAL DAILY
Qty: 90 CAPSULE | Refills: 1 | Status: CANCELLED | OUTPATIENT
Start: 2021-02-01

## 2021-02-01 RX ORDER — BACLOFEN 20 MG/1
20 TABLET ORAL 4 TIMES DAILY
Qty: 120 TABLET | Refills: 0 | Status: CANCELLED | OUTPATIENT
Start: 2021-02-01 | End: 2021-03-03

## 2021-02-01 RX ORDER — BUSPIRONE HYDROCHLORIDE 10 MG/1
10 TABLET ORAL 3 TIMES DAILY
Qty: 270 TABLET | Refills: 0 | Status: CANCELLED | OUTPATIENT
Start: 2021-02-01

## 2021-02-01 RX ORDER — CIPROFLOXACIN 500 MG/1
500 TABLET, FILM COATED ORAL 2 TIMES DAILY
Qty: 14 TABLET | Refills: 0 | Status: CANCELLED | OUTPATIENT
Start: 2021-02-01 | End: 2021-02-08

## 2021-02-01 RX ORDER — SUCRALFATE 1 G/1
1 TABLET ORAL 3 TIMES DAILY
Qty: 270 TABLET | Refills: 0 | Status: CANCELLED | OUTPATIENT
Start: 2021-02-01

## 2021-02-01 RX ORDER — HYDROXYZINE HYDROCHLORIDE 25 MG/1
25 TABLET, FILM COATED ORAL 3 TIMES DAILY
Qty: 30 TABLET | Refills: 0 | Status: CANCELLED | OUTPATIENT
Start: 2021-02-01 | End: 2021-02-11

## 2021-02-01 RX ORDER — METOPROLOL SUCCINATE 50 MG/1
50 TABLET, EXTENDED RELEASE ORAL DAILY
Qty: 90 TABLET | Refills: 1 | Status: CANCELLED | OUTPATIENT
Start: 2021-02-01

## 2021-02-01 NOTE — TELEPHONE ENCOUNTER
Date of last refill:01/13/2021    Is there an Inspect on file:?    Last appt date:12/23/2020     Next appt date:02/02/2021      Additional information:

## 2021-02-04 RX ORDER — PRENATAL VIT 27,CALC/IRON/FA 60 MG-1 MG
1 TABLET ORAL DAILY
Qty: 30 TABLET | Refills: 0 | Status: CANCELLED | OUTPATIENT
Start: 2021-02-04

## 2021-02-04 NOTE — TELEPHONE ENCOUNTER
Date of last refill:01/13/2021    Is there an Inspect on file:N/A    Last appt date:12/23/2020     Next appt date:02/09/2021      Additional information:

## 2021-02-04 NOTE — TELEPHONE ENCOUNTER
Date of last refill:    ELAVIL: 1- TO MAIL ORDER PHARMACY   PRENATAL: 1- TO MAIL ORDER PHARMACY     Is there an Inspect on file: N/A     Last appt date: 12-     Next appt date: 2-9-2021      Additional information:  NEEDS LOCAL RX SENT. ADNIEL'S PATIENT

## 2021-02-05 RX ORDER — PRENATAL VIT 27,CALC/IRON/FA 60 MG-1 MG
1 TABLET ORAL DAILY
Qty: 90 TABLET | Refills: 1 | Status: SHIPPED | OUTPATIENT
Start: 2021-02-05 | End: 2021-08-31

## 2021-02-05 RX ORDER — BACLOFEN 20 MG/1
20 TABLET ORAL 4 TIMES DAILY
Qty: 120 TABLET | Refills: 0 | Status: SHIPPED | OUTPATIENT
Start: 2021-02-05 | End: 2021-03-07

## 2021-02-05 RX ORDER — ESOMEPRAZOLE MAGNESIUM 40 MG/1
40 CAPSULE, DELAYED RELEASE ORAL 2 TIMES DAILY
Qty: 180 CAPSULE | Refills: 0 | Status: SHIPPED | OUTPATIENT
Start: 2021-02-05 | End: 2021-06-07

## 2021-02-05 RX ORDER — PROMETHAZINE HYDROCHLORIDE 25 MG/1
25 TABLET ORAL 2 TIMES DAILY PRN
Qty: 10 TABLET | Refills: 0 | Status: SHIPPED | OUTPATIENT
Start: 2021-02-05 | End: 2021-02-17 | Stop reason: SDUPTHER

## 2021-02-05 RX ORDER — DULOXETIN HYDROCHLORIDE 60 MG/1
60 CAPSULE, DELAYED RELEASE ORAL DAILY
Qty: 30 CAPSULE | Refills: 0 | Status: SHIPPED | OUTPATIENT
Start: 2021-02-05 | End: 2021-08-10 | Stop reason: SDUPTHER

## 2021-02-05 RX ORDER — HYDROXYZINE HYDROCHLORIDE 25 MG/1
25 TABLET, FILM COATED ORAL 3 TIMES DAILY
Qty: 30 TABLET | Refills: 0 | Status: SHIPPED | OUTPATIENT
Start: 2021-02-05 | End: 2021-02-15

## 2021-02-05 RX ORDER — AMITRIPTYLINE HYDROCHLORIDE 75 MG/1
75 TABLET, FILM COATED ORAL EVERY 24 HOURS
Qty: 30 TABLET | Refills: 0 | Status: SHIPPED | OUTPATIENT
Start: 2021-02-05 | End: 2021-09-27 | Stop reason: SDUPTHER

## 2021-02-08 ENCOUNTER — TELEPHONE (OUTPATIENT)
Dept: FAMILY MEDICINE CLINIC | Facility: CLINIC | Age: 61
End: 2021-02-08

## 2021-02-08 NOTE — TELEPHONE ENCOUNTER
PT CALLED TO SEE IF HER HOSPITAL FOLLOW-UP VISIT TOMORROW CAN BE SWITCHED TO A TELEPHONE VISIT.    SHE CAN BE REACHED [phone]

## 2021-02-10 NOTE — TELEPHONE ENCOUNTER
Date of last refill:02/05/2021    Is there an Inspect on file:N/A    Last appt date:12/23/2020     Next appt date:02/24/2021      Additional information:

## 2021-02-11 ENCOUNTER — TELEPHONE (OUTPATIENT)
Dept: FAMILY MEDICINE CLINIC | Facility: CLINIC | Age: 61
End: 2021-02-11

## 2021-02-11 NOTE — TELEPHONE ENCOUNTER
Hub is instructed to read this note to patient.    CALLED AND LEFT VM FOR PATIENT WE ARE NEEDING TO R/S APPT DUE TO PROVIDER BEING OUT ILL.

## 2021-02-17 ENCOUNTER — TELEPHONE (OUTPATIENT)
Dept: FAMILY MEDICINE CLINIC | Facility: CLINIC | Age: 61
End: 2021-02-17

## 2021-02-17 RX ORDER — PROMETHAZINE HYDROCHLORIDE 25 MG/1
25 TABLET ORAL 2 TIMES DAILY PRN
Qty: 10 TABLET | Refills: 0 | Status: SHIPPED | OUTPATIENT
Start: 2021-02-17 | End: 2021-02-24 | Stop reason: SDUPTHER

## 2021-02-17 RX ORDER — ESOMEPRAZOLE MAGNESIUM 40 MG/1
40 CAPSULE, DELAYED RELEASE ORAL 2 TIMES DAILY
Qty: 180 CAPSULE | Refills: 0 | Status: CANCELLED | OUTPATIENT
Start: 2021-02-17

## 2021-02-17 RX ORDER — CEPHALEXIN 500 MG/1
500 CAPSULE ORAL 3 TIMES DAILY
Qty: 30 CAPSULE | Refills: 0 | Status: CANCELLED | OUTPATIENT
Start: 2021-02-17

## 2021-02-17 RX ORDER — AMITRIPTYLINE HYDROCHLORIDE 75 MG/1
75 TABLET ORAL EVERY 24 HOURS
Qty: 30 TABLET | Refills: 0 | Status: CANCELLED | OUTPATIENT
Start: 2021-02-17

## 2021-02-17 RX ORDER — HYDROXYZINE HYDROCHLORIDE 25 MG/1
25 TABLET, FILM COATED ORAL 3 TIMES DAILY PRN
Qty: 30 TABLET | Refills: 0 | Status: SHIPPED | OUTPATIENT
Start: 2021-02-17 | End: 2021-02-24 | Stop reason: SDUPTHER

## 2021-02-17 NOTE — TELEPHONE ENCOUNTER
Date of last refill:02/05/2021    Is there an Inspect on file:N/A    Last appt date:02/18/2021     Next appt date:03/24/2021      Additional information:

## 2021-02-18 ENCOUNTER — TELEPHONE (OUTPATIENT)
Dept: FAMILY MEDICINE CLINIC | Facility: CLINIC | Age: 61
End: 2021-02-18

## 2021-02-18 RX ORDER — PROMETHAZINE HYDROCHLORIDE 25 MG/1
25 TABLET ORAL 2 TIMES DAILY
Qty: 180 TABLET | Refills: 0 | Status: CANCELLED | OUTPATIENT
Start: 2021-02-18

## 2021-02-18 RX ORDER — HYDROXYZINE HYDROCHLORIDE 25 MG/1
25 TABLET, FILM COATED ORAL 3 TIMES DAILY PRN
Qty: 90 TABLET | Refills: 1 | Status: CANCELLED | OUTPATIENT
Start: 2021-02-18

## 2021-02-18 RX ORDER — HYDROXYZINE HYDROCHLORIDE 25 MG/1
25 TABLET, FILM COATED ORAL 3 TIMES DAILY PRN
Qty: 270 TABLET | Refills: 0 | Status: CANCELLED | OUTPATIENT
Start: 2021-02-18

## 2021-02-18 RX ORDER — PROMETHAZINE HYDROCHLORIDE 25 MG/1
25 TABLET ORAL 2 TIMES DAILY PRN
Qty: 60 TABLET | Refills: 1 | Status: CANCELLED | OUTPATIENT
Start: 2021-02-18

## 2021-02-18 NOTE — TELEPHONE ENCOUNTER
CALLED AND S/W PATIENT DUE TO THESE JUST BEING SENT TO [facility] YESTERDAY. STATES THAT SHE IS ALMOST OUT BECAUSE SHE TAKES HER PHENERGAN TWICE DAILY EVERY DAY AND NOT AS NEEDED AS PRESCRIBED. NEEDS FULL RX FOR BOTH MEDS SENT TO LOCAL AND MAIL ORDER.

## 2021-02-18 NOTE — TELEPHONE ENCOUNTER
Caller: An Harmon    Relationship: Self    Best call back number: [phone] (H)    Medication needed:   promethazine (PHENERGAN) 25 MG tablet  25 mg, 2 Times Daily PRN 0 ordered         Summary: Take 1 tablet by mouth 2 (Two) Times a Day As Needed for Nausea or Vomiting., Starting Wed 2/17/2021, Normal   Dose, Route, Frequency: 25 mg, Oral, 2 Times Daily PRN        hydrOXYzine (ATARAX) 25 MG tablet  25 mg, 3 Times Daily PRN 0 ordered         Summary: Take 1 tablet by mouth 3 (Three) Times a Day As Needed for Itching., Starting Wed 2/17/2021, Normal   Dose, Route, Frequency: 25 mg, Oral, 3 Times Daily PRN  Start: 2/17/2021            When do you need the refill by: ASAP    What details did the patient provide when requesting the medication: PATIENT CALLED TO REQUEST A MEDICATION REFILL. PATIENT STATES THAT SHE WOULD LIKE TO HAVE A 30 SUPPLY SENT TO THE [facility], AND TO HAVE A 90 DAY SUPPLY SENT TO [facility] MAIL DELIVERY.    Does the patient have less than a 3 day supply:  [x] Yes  [] No    What is the patient's preferred pharmacy:    [facility] [address] [place] [address] [place]  [phone] [facility] [phone] [place]        THANKS

## 2021-02-18 NOTE — TELEPHONE ENCOUNTER
Caller: nA Harmon    Relationship: Self    Best call back number: [phone] (H)    Caller requesting test results: BLOOD WORK    What test was performed: BLOOD WORK    When was the test performed: 01/25/21 ?    Where was the test performed: [facility]LOOD WORK    Additional notes: PATIENT CALLED AND  STATES THAT SHE HAD  BLOOD WORK DONE ON 01/25/21 AND WAS TOLD THAT SOME OF THE RESULTS ARE ABNORMAL AND SHE WOULD LIKE FOR SOMEONE TO CONTACT HER TO ADVISE OF THE RESULTS.      THANKS

## 2021-02-18 NOTE — TELEPHONE ENCOUNTER
PATIENT WAS ADVISED THAT THOSE LABS WERE ORDERED, DRAWN, AND RESULTED BY On license of [facility] AND THAT PATIENT COULD DISCUSS THESE WITH THE PROVIDER AT HER UPCOMING APPT WITH ANY QUESTIONS/CONCERNS. SHE WAS ALSO ADVISED THAT PROVIDER WANTS HER TO HAVE LABS BEFORE HER NEXT APPT NEXT MONTH. FUTURE LAB APPT MADE PRIOR TO NEXT OV.

## 2021-02-23 NOTE — TELEPHONE ENCOUNTER
PATIENT CALLED STATING THAT SHE NEEDED 90 DAY SUPPLY OF BOTH OF THESE MEDICATIONS    PATIENT IS REQUESTING A CALL BACK FROM MA TO CLARIFY AND CONFIRM MED LIST    PLEASE CALL BACK AND ADVISE  [phone]

## 2021-02-24 RX ORDER — PROMETHAZINE HYDROCHLORIDE 25 MG/1
25 TABLET ORAL 2 TIMES DAILY PRN
Qty: 10 TABLET | Refills: 0 | Status: SHIPPED | OUTPATIENT
Start: 2021-02-24 | End: 2021-02-25 | Stop reason: SDUPTHER

## 2021-02-24 RX ORDER — HYDROXYZINE HYDROCHLORIDE 25 MG/1
25 TABLET, FILM COATED ORAL 3 TIMES DAILY PRN
Qty: 30 TABLET | Refills: 0 | Status: SHIPPED | OUTPATIENT
Start: 2021-02-24 | End: 2021-02-25 | Stop reason: SDUPTHER

## 2021-02-24 NOTE — TELEPHONE ENCOUNTER
Date of last refill:02/17/2021    Is there an Inspect on file:N/A    Last appt date:12/23/2020     Next appt date:03/24/2021      Additional information:

## 2021-02-25 ENCOUNTER — TELEPHONE (OUTPATIENT)
Dept: FAMILY MEDICINE CLINIC | Facility: CLINIC | Age: 61
End: 2021-02-25

## 2021-02-25 RX ORDER — PROMETHAZINE HYDROCHLORIDE 25 MG/1
25 TABLET ORAL 2 TIMES DAILY PRN
Qty: 180 TABLET | Refills: 0 | Status: SHIPPED | OUTPATIENT
Start: 2021-02-25 | End: 2021-05-27

## 2021-02-25 RX ORDER — HYDROXYZINE HYDROCHLORIDE 25 MG/1
25 TABLET, FILM COATED ORAL 3 TIMES DAILY PRN
Qty: 270 TABLET | Refills: 0 | Status: SHIPPED | OUTPATIENT
Start: 2021-02-25 | End: 2021-04-07

## 2021-02-25 NOTE — TELEPHONE ENCOUNTER
Caller: An Harmon    Relationship to patient: Self    Best call back number: [phone]    Patient is needing:       PATIENT CALLED IN STATING SHE SAW A DOCTOR IN THE ER RECENTLY, AND ENDED UP SHOWING THAT DR HER FINGER SHES HAD ISSUES WITH SWELLING, AND THAT DOCTOR  IN THE ER TOLD HER SHE WOULD NEED TO GO TO     [facility]     TO GET HER FINGER LOOKED AT.  PATIENT WANTED TO INFORM DANIEL JONES OF THIS.    I AM UNSURE IF THE PATIENT NEEDS DANIEL TO WRITE A REFERRAL ORDER FOR THIS TO BE DONE OR IF THE DOCTOR IN THE ER DID IT.      PLEASE CALL AND ADVISE PATIENT

## 2021-02-25 NOTE — TELEPHONE ENCOUNTER
CALLED AND S/W PATIENT DUE TO PATIENT CALLING INTO THE HUB RE: HER MEDICATIONS THAT WERE SENT YESTERDAY.  SHE STATES THAT THE MEDICATIONS SENT YESTERDAY TO HER [facility] MAIL ORDER PHARMACY WERE SUPPOSED TO BE FOR A 90 DAY SUPPLY AS TO WHERE IT IS COSTING HER MORE MONEY NOW FOR A SHORTER TERM SUPPLY OF MEDICATION. SHE STATES THAT SHE TAKES HER PHENERGAN ROUTINELY AND NOT AS NEEDED.  MEDICATIONS ARE LOADED AND READY TO SEND.

## 2021-03-02 ENCOUNTER — TELEPHONE (OUTPATIENT)
Dept: FAMILY MEDICINE CLINIC | Facility: CLINIC | Age: 61
End: 2021-03-02

## 2021-03-02 NOTE — TELEPHONE ENCOUNTER
RECORDS ARE UNDER THE MEDIA TAB ALREADY FROM [facility] ER. I AM NOT SURE WHETHER SHE WILL WANT TO COME INTO THE OFFICE TO JUST DISCUSS LABS. SHE DOES HAVE AN UPCOMING APPT WITH YOU ON 3-24 THOUGH.

## 2021-03-02 NOTE — TELEPHONE ENCOUNTER
PATIENT IS REQUESTING A CALL FROM DANIEL TO DISCUSS LABS THAT SHE HAD DONE AT THE HOSPITAL. PATIENT STATED SHE HAD THESE LABS DONE A COUPLE WEEKS AGO AT [facility]. PATIENT IS UNSURE IF OFFICE RECEIVED RESULTS. PLEASE ADVISE.    CALL BACK: [phone]

## 2021-03-02 NOTE — TELEPHONE ENCOUNTER
CALLED AND S/W PATIENT. SHE STATES THAT SHE WANTS TO DISCUSS THE LABS FROM [facility] ER THAT IS IN HER CHART FROM 1/25. STATES THAT ALL SHE WAS TOLD FROM [facility] ER IS THAT SHE NEEDS TO FOLLOW UP WITH HER PCP.

## 2021-03-03 ENCOUNTER — TELEPHONE (OUTPATIENT)
Dept: FAMILY MEDICINE CLINIC | Facility: CLINIC | Age: 61
End: 2021-03-03

## 2021-03-03 RX ORDER — CEPHALEXIN 500 MG/1
500 CAPSULE ORAL 3 TIMES DAILY
Qty: 30 CAPSULE | Refills: 0 | Status: SHIPPED | OUTPATIENT
Start: 2021-03-03 | End: 2021-05-27

## 2021-03-03 NOTE — TELEPHONE ENCOUNTER
Patient advised that she has a severe sinus infection that is causes the roof of her mouth to hang, and she is coughing up green mucus.     Patient is requesting an antibiotic.     [address] - [facility]  [place]  [phone]  - [phone] FX

## 2021-03-04 ENCOUNTER — TELEPHONE (OUTPATIENT)
Dept: FAMILY MEDICINE CLINIC | Facility: CLINIC | Age: 61
End: 2021-03-04

## 2021-03-04 DIAGNOSIS — R93.6 ABNORMAL X-RAY OF HAND: Primary | ICD-10-CM

## 2021-03-04 NOTE — TELEPHONE ENCOUNTER
Caller: An Harmon    Relationship: Self    Best call back number: [phone]    Medication needed:   Requested Prescriptions     Pending Prescriptions Disp Refills   • nystatin (MYCOSTATIN) 901291 UNIT/GM powder 60 g 0     Sig: Apply 1 application topically to the appropriate area as directed 3 (Three) Times a Day As Needed (RASH).       When do you need the refill by: ASAP    What details did the patient provide when requesting the medication: REQUESTING THE LARGER BOTTLE    Does the patient have less than a 3 day supply:  [x] Yes  [] No    What is the patient's preferred pharmacy: [facility] MA[facility] DELIV[facility] - [facility] [address] [facility] - [phone]  - [phone] FX

## 2021-03-04 NOTE — TELEPHONE ENCOUNTER
DANIEL,   PLEASE ADVISE ON IF YOU ARE WILLING TO PRESCRIBE PATIENT A LARGER BOTTLE OF MEDICATION.

## 2021-03-05 ENCOUNTER — TELEPHONE (OUTPATIENT)
Dept: FAMILY MEDICINE CLINIC | Facility: CLINIC | Age: 61
End: 2021-03-05

## 2021-03-05 NOTE — TELEPHONE ENCOUNTER
Caller: An Harmon    Relationship to patient: Self    Best call back number: [phone] (H)    Patient is needing: PATIENT CALLING IN REGARDS TO WANTING TO STOP GOING TO THE PAIN MANAGEMENT CLINIC AND HAVE HER PAIN MEDICATION SWITCHED TO DANIEL JONES PATIENT IS CALLING TO GET RABIA JONES'S ADVISE ON THE MATTER       PLEASE ADVISE      [facility] [address] [place] [address] - [facility]  [place]  [phone] [place] [phone]   [phone]

## 2021-03-08 DIAGNOSIS — G89.29 ENCOUNTER FOR CHRONIC PAIN MANAGEMENT: Primary | ICD-10-CM

## 2021-03-08 RX ORDER — NYSTATIN 100000 [USP'U]/G
1 POWDER TOPICAL 3 TIMES DAILY PRN
Qty: 60 G | Refills: 0 | Status: SHIPPED | OUTPATIENT
Start: 2021-03-08 | End: 2021-04-07

## 2021-03-08 RX ORDER — NYSTATIN 100000 [USP'U]/G
1 POWDER TOPICAL 3 TIMES DAILY PRN
Qty: 60 G | Refills: 0 | Status: SHIPPED | OUTPATIENT
Start: 2021-03-08 | End: 2021-03-08

## 2021-03-08 NOTE — TELEPHONE ENCOUNTER
DANIEL,   PLEASE ADVISE IF YOU WANT TO TAKE OVER PATIENT'S PAIN MEDICATION AND MANAGEMENT OF CHRONIC PAIN.  THANKS.

## 2021-03-08 NOTE — TELEPHONE ENCOUNTER
Dr. Weaver ( Pain mgmt) will start coming to this office on Tuesday's if you'd like to see if he would take her on or offer it to the patient?

## 2021-03-09 RX ORDER — HYDROXYZINE HYDROCHLORIDE 25 MG/1
TABLET, FILM COATED ORAL
Qty: 30 TABLET | Refills: 0 | OUTPATIENT
Start: 2021-03-09

## 2021-03-09 RX ORDER — PROMETHAZINE HYDROCHLORIDE 25 MG/1
TABLET ORAL
Qty: 10 TABLET | Refills: 0 | OUTPATIENT
Start: 2021-03-09

## 2021-03-09 RX ORDER — CEPHALEXIN 500 MG/1
500 CAPSULE ORAL 3 TIMES DAILY
Qty: 30 CAPSULE | Refills: 0 | OUTPATIENT
Start: 2021-03-09

## 2021-03-09 NOTE — TELEPHONE ENCOUNTER
Date of last refill:    Is there an Inspect on file:    Last appt date:     Next appt date:      Additional information:  Hub is instructed to read this note to patient.    THESE WERE TEMPORARY SUPPLIES THAT WERE SENT TO LOCAL PHARMACY FOR PATIENT UNTIL MAIL ORDER MEDICATIONS ARRIVED. REFILL REQUESTS DENIED AT THIS TIME AS TO WHERE PATIENT USES MAIL ORDER PHARMACY.

## 2021-03-09 NOTE — TELEPHONE ENCOUNTER
Date of last refill:03/03/2021    Is there an Inspect on file:N/A    Last appt date:12/07/2021     Next appt date:03/19/2021 FOR LABS,03/24/2021 FOR OV      Additional information:

## 2021-03-11 RX ORDER — NYSTATIN 100000 [USP'U]/G
1 POWDER TOPICAL 3 TIMES DAILY PRN
Qty: 60 G | Refills: 0 | Status: CANCELLED | OUTPATIENT
Start: 2021-03-11

## 2021-03-22 ENCOUNTER — TELEPHONE (OUTPATIENT)
Dept: FAMILY MEDICINE CLINIC | Facility: CLINIC | Age: 61
End: 2021-03-22

## 2021-03-23 NOTE — TELEPHONE ENCOUNTER
CALLED AND PATIENT AND ADVISED HER THAT THE LAST COVID 19 TEST THAT SHE HAD HERE WAS IN December 2020, AND IT WAS NEGATIVE AND THAT WE HAD NOTIFIED HER OF THAT. SHE VOICED UNDERSTANDING, AND STATED THAT SHE WAS HOPING THAT THE RESULT FROM THEN WOULD WORK FOR HER UPCOMING EGD/COLONOSCOPY TESTING. ADVISED HER THAT SHE WOULD NEED TO REACH OUT TO THAT PROVIDER AND SEE WHERE THEY WANT HER TO HAVE IT COMPLETED AS WELL AS TO WHERE WE DO NOT PERFORM COVID TESTING FOR PRE-PROCEDURE, AND TYPICALLY THE PROVIDER WANTS THE COVID TEST COMPLETED 2 TO 3 DAYS BEFORE THE PROCEDURE. SHE VOICED UNDERSTANDING AND WILL REACH OUT TO THEM.

## 2021-03-25 ENCOUNTER — TELEPHONE (OUTPATIENT)
Dept: FAMILY MEDICINE CLINIC | Facility: CLINIC | Age: 61
End: 2021-03-25

## 2021-03-25 NOTE — TELEPHONE ENCOUNTER
PATIENT CALLED IN REQUESTING A CALL BACK FROM THE NURSE REGARDING THE HEADACHE SHE'S HAVING, PATIENT SAID SHE'S NOT SURE IF IT'S HER BLOOD PRESSURE.    BEST CALL BACK # [phone]

## 2021-03-26 NOTE — TELEPHONE ENCOUNTER
CALLED AND SPOKE WITH NAM SHE STATED THAT HER HEADACHE IS BETTER, SHE HAS SOME CONCERNS ABOUT HER BLOOD WORK THAT SHE HAD DONE AT [facility] IN January AND I TOLD HER TO DISCUSS THAT WITH DANIEL AT HER APPT ON Wednesday.

## 2021-04-06 NOTE — TELEPHONE ENCOUNTER
Date of last refill:    NYSTATIN: 3-8-2021  ATARAX: 2-  DOXEPIN: 1-  BACLOFEN: 2-5-2021    Is there an Inspect on file: N/A     Last appt date:  12-     Next appt date: NONE      Additional information:

## 2021-04-07 RX ORDER — BACLOFEN 20 MG/1
TABLET ORAL
Qty: 360 TABLET | Refills: 0 | Status: SHIPPED | OUTPATIENT
Start: 2021-04-07 | End: 2021-05-27

## 2021-04-07 RX ORDER — HYDROXYZINE HYDROCHLORIDE 25 MG/1
TABLET, FILM COATED ORAL
Qty: 270 TABLET | Refills: 0 | Status: SHIPPED | OUTPATIENT
Start: 2021-04-07 | End: 2021-05-28 | Stop reason: SDUPTHER

## 2021-04-07 RX ORDER — NYSTATIN 100000 [USP'U]/G
POWDER TOPICAL
Qty: 60 G | Refills: 0 | Status: SHIPPED | OUTPATIENT
Start: 2021-04-07 | End: 2021-05-27

## 2021-04-07 RX ORDER — DOXEPIN HYDROCHLORIDE 25 MG/1
CAPSULE ORAL
Qty: 90 CAPSULE | Refills: 0 | Status: SHIPPED | OUTPATIENT
Start: 2021-04-07 | End: 2021-05-27

## 2021-04-14 PROBLEM — B37.2 YEAST DERMATITIS: Status: ACTIVE | Noted: 2021-04-14

## 2021-04-19 RX ORDER — HYDROXYZINE HYDROCHLORIDE 25 MG/1
TABLET, FILM COATED ORAL
Qty: 30 TABLET | Refills: 0 | OUTPATIENT
Start: 2021-04-19

## 2021-04-19 NOTE — TELEPHONE ENCOUNTER
Date of last refill: 4-7-2021    Is there an Inspect on file: N/A     Last appt date:  12-     Next appt date: NONE       Additional information:  Hub is instructed to read this note to patient.  JUST SENT THIS MEDICATION OVER ON 4-7-2021. MED REFILL REQUEST FOR HYDROXYZINE DENIED AS TO WHERE IT IS TOO EARLY TO FILL, AND PT USES [facility] ORDER [facility]ARMAC[facility] FOR LONG TERM PRESCRIPTIONS.

## 2021-05-04 PROBLEM — R13.10 DYSPHAGIA: Status: ACTIVE | Noted: 2017-07-07

## 2021-05-04 PROBLEM — Z86.010 PERSONAL HISTORY OF COLONIC POLYPS: Status: ACTIVE | Noted: 2017-07-07

## 2021-05-04 NOTE — TELEPHONE ENCOUNTER
Date of last refill: 01/29/21    Is there an Inspect on file: NA    Last appt date:  12/23/20     Next appt date: NONE      Additional information:   LAST 7 VISITS HAVE BEEN CANCELED

## 2021-05-05 RX ORDER — MONTELUKAST SODIUM 10 MG/1
TABLET ORAL
Qty: 90 TABLET | Refills: 0 | Status: SHIPPED | OUTPATIENT
Start: 2021-05-05 | End: 2021-07-13

## 2021-05-12 ENCOUNTER — TELEPHONE (OUTPATIENT)
Dept: FAMILY MEDICINE CLINIC | Facility: CLINIC | Age: 61
End: 2021-05-12

## 2021-05-12 NOTE — TELEPHONE ENCOUNTER
Caller: An Harmon    Relationship: Self    Best call back number: [phone]    What is the best time to reach you: AFTER NOON     Who are you requesting to speak with (clinical staff, provider,  specific staff member): DANIEL JONES    Do you know the name of the person who called: SELF    What was the call regarding: PROBLEM WITH THE PAIN CLINIC    Do you require a callback: YES

## 2021-05-13 NOTE — TELEPHONE ENCOUNTER
Please contact patient to see what is happening with the pain clinic  If she is asking me to prescribe her meds please advise her that our pain clinic can see her here for evaluation

## 2021-05-13 NOTE — TELEPHONE ENCOUNTER
"DANIEL,  I REVIEWED HER REFERRAL AND IT APPEARS THAT PATIENT REFUSED TO SEE OUR PAIN MGMT CLINIC AS TO WHERE KELLY MCKINNON HAD PLACED THE FOLLOWING COMMENT INTO THE REFERRAL UNDER THE COMMUNICATION TAB OF WHAT PATIENT STATED:  \"PATIENT REFUSING RELEASE OF HER MEDICAL RECORDS FROM HER CURRENT PAIN MGMT PROV- DOES NOT WANT HER RECORDS REQUESTED FROM CURRENT PM\"  "

## 2021-05-14 ENCOUNTER — TELEPHONE (OUTPATIENT)
Dept: FAMILY MEDICINE CLINIC | Facility: CLINIC | Age: 61
End: 2021-05-14

## 2021-05-17 RX ORDER — GABAPENTIN 800 MG/1
TABLET ORAL
Qty: 120 TABLET | Refills: 0 | Status: SHIPPED | OUTPATIENT
Start: 2021-05-17 | End: 2021-05-20

## 2021-05-17 NOTE — TELEPHONE ENCOUNTER
Date of last refill: 01/13/2021    Is there an Inspect on file: N/A    Last appt date: 12/23/2020     Next appt date:  N/A      Additional information:

## 2021-05-17 NOTE — TELEPHONE ENCOUNTER
I RAN HER INSPECT AND IT SHOWS THAT SHE IS CURRENTLY GETTING PRESCRIBED PERCOCET   FROM THEM. INSPECT IS SCANNED IN FOR ANY ADDITIONAL QUESTIONS.

## 2021-05-18 DIAGNOSIS — G89.29 ENCOUNTER FOR CHRONIC PAIN MANAGEMENT: Primary | ICD-10-CM

## 2021-05-18 RX ORDER — OXYCODONE AND ACETAMINOPHEN 10; 325 MG/1; MG/1
1 TABLET ORAL EVERY 6 HOURS PRN
Qty: 60 TABLET | Refills: 0 | Status: SHIPPED | OUTPATIENT
Start: 2021-05-18 | End: 2021-06-02

## 2021-05-18 NOTE — TELEPHONE ENCOUNTER
Let her know that  I will refill these x one and discuss with Dr Martinez when she returns as to if we can continue to prescribe these on a regular basis

## 2021-05-20 NOTE — TELEPHONE ENCOUNTER
PATIENT NOTIFIED. SHE VOICED UNDERSTANDING. ADVISED HER OF APPT DATE, AND TIME AND OF IT BEING IN OFFICE.

## 2021-05-27 DIAGNOSIS — F41.9 ANXIETY: ICD-10-CM

## 2021-05-27 RX ORDER — PROMETHAZINE HYDROCHLORIDE 25 MG/1
TABLET ORAL
Qty: 180 TABLET | Refills: 0 | Status: SHIPPED | OUTPATIENT
Start: 2021-05-27 | End: 2021-07-30 | Stop reason: SDUPTHER

## 2021-05-27 RX ORDER — DOXEPIN HYDROCHLORIDE 25 MG/1
CAPSULE ORAL
Qty: 90 CAPSULE | Refills: 0 | Status: SHIPPED | OUTPATIENT
Start: 2021-05-27 | End: 2021-09-27 | Stop reason: SDUPTHER

## 2021-05-27 RX ORDER — CEPHALEXIN 500 MG/1
CAPSULE ORAL
Qty: 30 CAPSULE | Refills: 0 | Status: SHIPPED | OUTPATIENT
Start: 2021-05-27 | End: 2021-08-25

## 2021-05-27 RX ORDER — BACLOFEN 20 MG/1
TABLET ORAL
Qty: 360 TABLET | Refills: 0 | Status: SHIPPED | OUTPATIENT
Start: 2021-05-27 | End: 2021-08-27

## 2021-05-27 RX ORDER — NYSTATIN 100000 [USP'U]/G
POWDER TOPICAL
Qty: 60 G | Refills: 0 | Status: SHIPPED | OUTPATIENT
Start: 2021-05-27 | End: 2021-06-24

## 2021-05-27 RX ORDER — GABAPENTIN 800 MG/1
TABLET ORAL
Qty: 240 TABLET | Refills: 0 | Status: SHIPPED | OUTPATIENT
Start: 2021-05-27 | End: 2021-08-05 | Stop reason: SDUPTHER

## 2021-05-27 RX ORDER — SUCRALFATE 1 G/1
TABLET ORAL
Qty: 270 TABLET | Refills: 0 | Status: SHIPPED | OUTPATIENT
Start: 2021-05-27 | End: 2021-09-27 | Stop reason: SDUPTHER

## 2021-05-28 RX ORDER — BUSPIRONE HYDROCHLORIDE 10 MG/1
TABLET ORAL
Qty: 270 TABLET | Refills: 0 | Status: SHIPPED | OUTPATIENT
Start: 2021-05-28 | End: 2021-09-27 | Stop reason: SDUPTHER

## 2021-05-28 RX ORDER — HYDROXYZINE HYDROCHLORIDE 25 MG/1
25 TABLET, FILM COATED ORAL 3 TIMES DAILY
Qty: 270 TABLET | Refills: 0 | Status: SHIPPED | OUTPATIENT
Start: 2021-05-28 | End: 2021-07-30 | Stop reason: SDUPTHER

## 2021-05-28 NOTE — TELEPHONE ENCOUNTER
Date of last refill:  01/29/21  Is there an Inspect on file:NA    Last appt date:12/23/20     Next appt date:NONE      Additional information:

## 2021-05-28 NOTE — TELEPHONE ENCOUNTER
Date of last refill:   HYDROXYZINE 04/07/2021  METOPROLOL 01/13/2021    Is there an Inspect on file: NA    Last appt date: 04/06/2021     Next appt date: NONE      Additional information:

## 2021-06-01 RX ORDER — METOPROLOL SUCCINATE 50 MG/1
50 TABLET, EXTENDED RELEASE ORAL DAILY
Qty: 90 TABLET | Refills: 1 | Status: SHIPPED | OUTPATIENT
Start: 2021-06-01 | End: 2021-09-27 | Stop reason: SDUPTHER

## 2021-06-01 NOTE — TELEPHONE ENCOUNTER
Date of last refill: 01/13/2021    Is there an Inspect on file: N/A    Last appt date: 04/06/2021     Next appt date: N/A      Additional information:

## 2021-06-07 RX ORDER — ESOMEPRAZOLE MAGNESIUM 40 MG/1
40 CAPSULE, DELAYED RELEASE ORAL 2 TIMES DAILY
Qty: 180 CAPSULE | Refills: 0 | Status: SHIPPED | OUTPATIENT
Start: 2021-06-07 | End: 2021-08-20

## 2021-06-08 DIAGNOSIS — G89.29 OTHER CHRONIC PAIN: ICD-10-CM

## 2021-06-08 DIAGNOSIS — M05.9 RHEUMATOID ARTHRITIS WITH POSITIVE RHEUMATOID FACTOR, INVOLVING UNSPECIFIED SITE (HCC): Primary | ICD-10-CM

## 2021-06-08 DIAGNOSIS — M48.00 SPINAL STENOSIS, UNSPECIFIED SPINAL REGION: ICD-10-CM

## 2021-06-08 NOTE — TELEPHONE ENCOUNTER
Caller: An Harmon    Relationship: Self    Best call back number: [phone]     Medication needed:  OXYCODONE / APAP 77225 MG TABLETS     When do you need the refill by: 06/8/21    What additional details did the patient provide when requesting the medication: PATIENT CALLING WANTING TO KNOW IF THIS CAN BE REFILLED. SHE IS UNSURE OF WHEN THIS IS DUE FOR A REFILL  Does the patient have less than a 3 day supply:  [] Yes  [x] No    What is the patient's preferred pharmacy: [facility] - [facility] [address] [facility] - [phone]  - [phone] FX

## 2021-06-09 RX ORDER — OXYCODONE AND ACETAMINOPHEN 10; 325 MG/1; MG/1
1 TABLET ORAL EVERY 6 HOURS PRN
Qty: 60 TABLET | Refills: 0 | Status: CANCELLED | OUTPATIENT
Start: 2021-06-09

## 2021-06-09 NOTE — TELEPHONE ENCOUNTER
DANIEL,   PLEASE ADVISE.     Date of last refill: 5- QTY# 60 (15 DAY SUPPLY)    Is there an Inspect on file: YES- UPDATED ON 6-9-2021 AND IS SCANNED INTO CHART     Last appt date: 12-     Next appt date: NONE      Additional information:

## 2021-06-10 DIAGNOSIS — M05.9 RHEUMATOID ARTHRITIS WITH POSITIVE RHEUMATOID FACTOR, INVOLVING UNSPECIFIED SITE (HCC): Primary | ICD-10-CM

## 2021-06-10 NOTE — TELEPHONE ENCOUNTER
CALLED AND ADVISED PT OF THE ABOVE. SHE VOICED UNDERSTANDING, AND OF WHEN PM IS HERE AT THE [facility] LOCATION. PRIOR REFERRAL FROM 3-8-2021 IS CLOSED OUT DUE TO PATIENT NOT WANTING RECORDS REQUESTED FROM PREVIOUS PM,SO NEW REFERRAL WILL HAVE TO BE PLACED. I AM NOT SURE ON HOW LONG IT WILL TAKE FOR PM TO REVIEW HER RECORDS AND SCHEDULE HER THOUGH.

## 2021-06-11 RX ORDER — OXYCODONE AND ACETAMINOPHEN 10; 325 MG/1; MG/1
1 TABLET ORAL EVERY 6 HOURS PRN
Qty: 60 TABLET | Refills: 0 | Status: SHIPPED | OUTPATIENT
Start: 2021-06-11 | End: 2021-07-06 | Stop reason: SDUPTHER

## 2021-06-18 ENCOUNTER — TELEPHONE (OUTPATIENT)
Dept: FAMILY MEDICINE CLINIC | Facility: CLINIC | Age: 61
End: 2021-06-18

## 2021-06-18 NOTE — TELEPHONE ENCOUNTER
"PATIENT CALLED INTO OFFICE EARLIER THIS DAY, AND S/W HUB REPRESENTATIVE AND WANTED TO MAKE AN APPT. HOWEVER, PATIENT ADVISED HUB REPRESENTATIVE THAT SHE FELL ON TUES, DID NOT SEEK ER/MEDICAL ATTENTION THEN AND STILL HAS NOT, BUT IS C/O HER RIBS FEELING LIKE SOMETHING IS WRONG OR THEY'RE \"OVERLAPPING\". HUB REP ADVISED THAT SHE HAD TOLD PATIENT TO GO TO ER, BUT PATIENT HAD INSISTED ON SCHEDULING AN APPT WITH PROVIDER ROBERT. [facility] TRANSFERRED THIS PT THROUGH TO THIS NURSE. THIS NURSE SPOKE TO PATIENT AND ADVISED HER THAT UNFORTUNATELY PROVIDER ROBERT IS NOT IN THE OFFICE THIS DAY, AND WILL NOT BE BACK ON MON EITHER. PATIENT WAS ADAMANT TO OBTAIN OFFICE VISIT WITH PROVIDER ROBERT. PATIENT WAS ADVISED THAT SHE WILL DEFINITELY NEED TO GO TO THE ER, ESPECIALLY SINCE NO PROVIDER IS HERE IN OFFICE TODAY (FRI OR MON), AND THAT SHE WILL MORE THAN LIKELY NEED X-RAYS AND SUCH, WHICH WE ARE UNABLE TO PERFORM HERE IN OFFICE. SHE VOICED UNDERSTANDING AND STATED THAT SHE WOULD GO TO ER.    "

## 2021-06-21 RX ORDER — BACLOFEN 20 MG/1
TABLET ORAL
Qty: 360 TABLET | Refills: 0 | OUTPATIENT
Start: 2021-06-21

## 2021-06-21 NOTE — TELEPHONE ENCOUNTER
Hub is instructed to read this note to patient.  REFILL REQUEST RECEIVED FOR BACLOFEN. MEDICATION WAS JUST SENT FOR A 3 MONTHS SUPPLY TO [facility] ON 5-. PT NEEDS TO CONTACT PHARMACY.

## 2021-06-22 ENCOUNTER — TELEPHONE (OUTPATIENT)
Dept: FAMILY MEDICINE CLINIC | Facility: CLINIC | Age: 61
End: 2021-06-22

## 2021-06-22 NOTE — TELEPHONE ENCOUNTER
Caller: An Harmon    Relationship: Self    Best call back number: [phone]  What medication are you requesting: MIGRAINE MEDS  What are your current symptoms: NON STOP MIGRAINE, NAUSEOUS    How long have you been experiencing symptoms: 2 DAYS    Have you had these symptoms before:    [x] Yes  [] No    Have you been treated for these symptoms before:   [x] Yes  [] No    If a prescription is needed, what is your preferred [address] [phone] [facility] [phone]      Additional notes:PATIENT STATED THAT SHE HAS A MIGRAINE THAT WILL NOT GO AWAY. SHE WOULD LIKE DANIEL JONES TO CALL SOMETHING IN FOR HER HEADACHE. SHE TAKES MIGRAINE MEDICATION BUT IT IS NOT HELPING AT ALL.

## 2021-06-22 NOTE — TELEPHONE ENCOUNTER
CALLED AND SPOKE WITH NAM AND TOLD HER THAT DR MABRY STATED THAT SHE WOULD NEED TO COME IN THE OFFICE, SHE STATED THAT SHE WAS GOING TO TRY AND SLEEP OF THE MIGRAINE AND IF SHE DIDN'T FEEL BETTER SHE WOULD CALL TOMORROW

## 2021-06-24 RX ORDER — NYSTATIN 100000 [USP'U]/G
POWDER TOPICAL
Qty: 30 G | Refills: 0 | Status: SHIPPED | OUTPATIENT
Start: 2021-06-24 | End: 2021-08-16 | Stop reason: SDUPTHER

## 2021-06-24 RX ORDER — CEPHALEXIN 500 MG/1
CAPSULE ORAL
Qty: 30 CAPSULE | Refills: 0 | OUTPATIENT
Start: 2021-06-24

## 2021-06-24 NOTE — TELEPHONE ENCOUNTER
Date of last refill:05/27/2021    Is there an Inspect on file:N/A    Last appt date:12/23/2020     Next appt date:N/A      Additional information:

## 2021-07-06 DIAGNOSIS — M05.9 RHEUMATOID ARTHRITIS WITH POSITIVE RHEUMATOID FACTOR, INVOLVING UNSPECIFIED SITE (HCC): ICD-10-CM

## 2021-07-06 DIAGNOSIS — G89.29 OTHER CHRONIC PAIN: ICD-10-CM

## 2021-07-06 DIAGNOSIS — M48.00 SPINAL STENOSIS, UNSPECIFIED SPINAL REGION: ICD-10-CM

## 2021-07-06 NOTE — TELEPHONE ENCOUNTER
She needs to come into office but I want to talk with Dr Martinez about continuing to prescribe these medications as I want her approval  It may be of benefit to have Juan see her when she gets back  We can refill until she is seen but before another refill can occur it will need to be approved by Juan

## 2021-07-06 NOTE — TELEPHONE ENCOUNTER
Date of last refill: 06/11/2021    Is there an Inspect on file:PLEASE RUN    Last appt date:12/23/2021     Next appt date: NONE      Additional information: IT HAS BEEN OVER 6 MONTHS SINCE SHE HAS HAD AN APPT

## 2021-07-06 NOTE — TELEPHONE ENCOUNTER
INSPECT UPDATED ON 7-6-2021 AND IS SCANNED INTO CHART.     FYI - THE FOLLOWING IS CORRESPONDENCE FROM PAIN MGMT/ HUB NOTATED IN THE REFERRAL:    SPOKE WITH PT AND SHE DOES NOT WANT INJECTIONS  - WILL ROUTE BACK TO REFERRING , HUB READY TO SCHEDULE WITH DR. RODRIGUEZ IN [place] FOR INJECTIONS ONLY PER COMMUNICATION ABOVE. (ADVISE PATIENT OF THAT IT IS FOR INJECTIONS ONLY), Patient was discharged from previous pain center due to failed pill count. We will only see patients for injections only.

## 2021-07-06 NOTE — TELEPHONE ENCOUNTER
Caller: An Harmon    Relationship: Self    Best call back number:[phone]     Medication needed:   Requested Prescriptions     Pending Prescriptions Disp Refills   • oxyCODONE-acetaminophen (Percocet)  MG per tablet 60 tablet 0     Sig: Take 1 tablet by mouth Every 6 (Six) Hours As Needed for Moderate Pain .       When do you need the refill by: 07/06/21    What additional details did the patient provide when requesting the medication: PATIENT CALLING STATING SHE IS UNSURE WHEN THIS IS DUE TO BE FILLED. SHE STATED SHE IS UNSURE OF HOW MANY DAYS SHE HAS LEFT. PATIENT WOULD LIKE TO GET THIS FILLED THROUGH [facility] IF ITS ABOUT 2 WEEKS OR MORE TO BE FILLED  TO GIVE HER ENOUGH TIME TO GET IT IN THE MAIL. PATIENT STATED  IF ITS DUE SOONER SHE WOULD LIKE TO HAVE IT SENT TO THE [facility]. PLEASE ADVISE    Does the patient have less than a 3 day supply:  [] Yes  [x] No    What is the patient's preferred pharmacy: [facility] MAIL DELIVERY - [facility] [address] [facility] - [phone]  - [phone]

## 2021-07-08 RX ORDER — OXYCODONE AND ACETAMINOPHEN 10; 325 MG/1; MG/1
1 TABLET ORAL EVERY 6 HOURS PRN
Qty: 30 TABLET | Refills: 0 | Status: SHIPPED | OUTPATIENT
Start: 2021-07-08 | End: 2021-08-25 | Stop reason: SDUPTHER

## 2021-07-08 NOTE — TELEPHONE ENCOUNTER
Aarti,  Do you mind to contact patient to schedule her for an office visit for a follow up, please?    Thank you.

## 2021-07-12 NOTE — TELEPHONE ENCOUNTER
Date of last refill: 05/05/2021    Is there an Inspect on file:NA    Last appt date:12/23/2020     Next appt date:07/28/2021      Additional information: HAS MISSED LAST 8 APPOINTMENTS

## 2021-07-13 RX ORDER — DULOXETIN HYDROCHLORIDE 60 MG/1
CAPSULE, DELAYED RELEASE ORAL
Qty: 90 CAPSULE | Refills: 1 | Status: SHIPPED | OUTPATIENT
Start: 2021-07-13 | End: 2021-08-31 | Stop reason: SDUPTHER

## 2021-07-13 RX ORDER — MONTELUKAST SODIUM 10 MG/1
TABLET ORAL
Qty: 90 TABLET | Refills: 0 | Status: SHIPPED | OUTPATIENT
Start: 2021-07-13 | End: 2021-09-27 | Stop reason: SDUPTHER

## 2021-07-14 DIAGNOSIS — G89.29 OTHER CHRONIC PAIN: ICD-10-CM

## 2021-07-14 DIAGNOSIS — M05.9 RHEUMATOID ARTHRITIS WITH POSITIVE RHEUMATOID FACTOR, INVOLVING UNSPECIFIED SITE (HCC): ICD-10-CM

## 2021-07-14 DIAGNOSIS — M48.00 SPINAL STENOSIS, UNSPECIFIED SPINAL REGION: ICD-10-CM

## 2021-07-14 RX ORDER — OXYCODONE AND ACETAMINOPHEN 10; 325 MG/1; MG/1
1 TABLET ORAL EVERY 6 HOURS PRN
Qty: 30 TABLET | Refills: 0 | Status: CANCELLED | OUTPATIENT
Start: 2021-07-14

## 2021-07-14 NOTE — TELEPHONE ENCOUNTER
Caller: An Harmon    Relationship: Self    Best call back number: [phone]    Medication needed:   Requested Prescriptions     Pending Prescriptions Disp Refills   • oxyCODONE-acetaminophen (Percocet)  MG per tablet 30 tablet 0     Sig: Take 1 tablet by mouth Every 6 (Six) Hours As Needed for Moderate Pain .       When do you need the refill by: 07/14 ASAP    What additional details did the patient provide when requesting the medication: PATIENT HAS AN APT SCHEDULED ON 07/28 SHE IS GOING TO BE OUT OF MEDS AND SHE HAS BEEN ON THIS MEDICINE FOR YEARS SHE WILL GET SICK. SHE STATES SHE HAS HAD TELEPHONE VISITS  SHE HASN'T MISSED THEM. PLEASE REFILL    Does the patient have less than a 3 day supply:  [x] Yes  [] No    What is the patient's preferred pharmacy: [facility] [address] [place] [address] BRITT  [phone] [facility] [phone] FX

## 2021-07-30 RX ORDER — HYDROXYZINE HYDROCHLORIDE 25 MG/1
25 TABLET, FILM COATED ORAL 3 TIMES DAILY
Qty: 270 TABLET | Refills: 0 | Status: SHIPPED | OUTPATIENT
Start: 2021-07-30 | End: 2021-10-06

## 2021-07-30 RX ORDER — PROMETHAZINE HYDROCHLORIDE 25 MG/1
25 TABLET ORAL EVERY 12 HOURS PRN
Qty: 30 TABLET | Refills: 0 | Status: SHIPPED | OUTPATIENT
Start: 2021-07-30 | End: 2021-09-07 | Stop reason: SDUPTHER

## 2021-07-30 NOTE — TELEPHONE ENCOUNTER
Date of last refill: 05/27/2021    Is there an Inspect on file:N/A     Last Appointment:12/23/2020    Next Appointment: 08/02/2021       Additional information:

## 2021-08-02 ENCOUNTER — TELEPHONE (OUTPATIENT)
Dept: FAMILY MEDICINE CLINIC | Facility: CLINIC | Age: 61
End: 2021-08-02

## 2021-08-05 NOTE — TELEPHONE ENCOUNTER
Caller: [facility] MAIL DELIVERY - [facility] [address] [facility] - [phone] [facility] [phone] FX    Relationship: Pharmacy    Medication needed:   Requested Prescriptions     Pending Prescriptions Disp Refills   • gabapentin (NEURONTIN) 800 MG tablet 240 tablet 0     Sig: Take 1 tablet by mouth 4 (Four) Times a Day.     What is the patient's preferred pharmacy: [facility] MAIL DELIVERY - [facility] [address] [facility] - [phone] [facility] [phone] FX

## 2021-08-06 DIAGNOSIS — F41.9 ANXIETY: ICD-10-CM

## 2021-08-06 RX ORDER — GABAPENTIN 800 MG/1
TABLET ORAL
Qty: 120 TABLET | Refills: 0 | Status: SHIPPED | OUTPATIENT
Start: 2021-08-06 | End: 2021-09-27 | Stop reason: SDUPTHER

## 2021-08-06 RX ORDER — ESOMEPRAZOLE MAGNESIUM 40 MG/1
40 CAPSULE, DELAYED RELEASE ORAL 2 TIMES DAILY
Qty: 180 CAPSULE | Refills: 0 | Status: CANCELLED | OUTPATIENT
Start: 2021-08-06

## 2021-08-06 RX ORDER — BUSPIRONE HYDROCHLORIDE 10 MG/1
10 TABLET ORAL 3 TIMES DAILY
Qty: 270 TABLET | Refills: 0 | Status: CANCELLED | OUTPATIENT
Start: 2021-08-06

## 2021-08-06 RX ORDER — METOPROLOL SUCCINATE 50 MG/1
50 TABLET, EXTENDED RELEASE ORAL DAILY
Qty: 90 TABLET | Refills: 1 | Status: CANCELLED | OUTPATIENT
Start: 2021-08-06

## 2021-08-06 RX ORDER — PRENATAL VIT 27,CALC/IRON/FA 60 MG-1 MG
1 TABLET ORAL DAILY
Qty: 90 TABLET | Refills: 1 | Status: CANCELLED | OUTPATIENT
Start: 2021-08-06

## 2021-08-06 RX ORDER — SUCRALFATE 1 G/1
1 TABLET ORAL 3 TIMES DAILY
Qty: 270 TABLET | Refills: 0 | Status: CANCELLED | OUTPATIENT
Start: 2021-08-06

## 2021-08-06 RX ORDER — BACLOFEN 20 MG/1
20 TABLET ORAL 4 TIMES DAILY
Qty: 360 TABLET | Refills: 0 | Status: CANCELLED | OUTPATIENT
Start: 2021-08-06

## 2021-08-06 RX ORDER — AMITRIPTYLINE HYDROCHLORIDE 75 MG/1
75 TABLET ORAL EVERY 24 HOURS
Qty: 30 TABLET | Refills: 0 | Status: CANCELLED | OUTPATIENT
Start: 2021-08-06

## 2021-08-06 RX ORDER — NYSTATIN 100000 [USP'U]/G
POWDER TOPICAL
Qty: 30 G | Refills: 0 | Status: CANCELLED | OUTPATIENT
Start: 2021-08-06

## 2021-08-06 RX ORDER — DOXEPIN HYDROCHLORIDE 25 MG/1
25 CAPSULE ORAL DAILY
Qty: 90 CAPSULE | Refills: 0 | Status: CANCELLED | OUTPATIENT
Start: 2021-08-06

## 2021-08-06 NOTE — TELEPHONE ENCOUNTER
Rx Refill Note  Requested Prescriptions     Pending Prescriptions Disp Refills   • gabapentin (NEURONTIN) 800 MG tablet 240 tablet 0     Sig: Take 1 tablet by mouth 4 (Four) Times a Day.      Last office visit with prescribing clinician: 12/23/2020      Next office visit with prescribing clinician: Visit date not found 8- WITH DR. MABRY.              Shaunna Martínez LPN  08/06/21, 11:00 EDT

## 2021-08-06 NOTE — TELEPHONE ENCOUNTER
Rx Refill Note  Requested Prescriptions      No prescriptions requested or ordered in this encounter      Last office visit with prescribing clinician: 12/23/2020      Next office visit with prescribing clinician: Visit date not found            Courtney Andrews  08/06/21, 12:59 EDT

## 2021-08-10 ENCOUNTER — OFFICE VISIT (OUTPATIENT)
Dept: FAMILY MEDICINE CLINIC | Facility: CLINIC | Age: 61
End: 2021-08-10

## 2021-08-10 VITALS
HEIGHT: 63 IN | OXYGEN SATURATION: 95 % | SYSTOLIC BLOOD PRESSURE: 137 MMHG | TEMPERATURE: 96.9 F | HEART RATE: 80 BPM | BODY MASS INDEX: 37.21 KG/M2 | DIASTOLIC BLOOD PRESSURE: 78 MMHG | WEIGHT: 210 LBS

## 2021-08-10 DIAGNOSIS — M51.37 DDD (DEGENERATIVE DISC DISEASE), LUMBOSACRAL: ICD-10-CM

## 2021-08-10 DIAGNOSIS — R09.81 CHRONIC NASAL CONGESTION: ICD-10-CM

## 2021-08-10 DIAGNOSIS — E55.9 VITAMIN D DEFICIENCY: ICD-10-CM

## 2021-08-10 DIAGNOSIS — R73.03 PREDIABETES: ICD-10-CM

## 2021-08-10 DIAGNOSIS — I10 ESSENTIAL HYPERTENSION: ICD-10-CM

## 2021-08-10 DIAGNOSIS — R30.0 DYSURIA: ICD-10-CM

## 2021-08-10 DIAGNOSIS — Z78.0 POSTMENOPAUSE: ICD-10-CM

## 2021-08-10 DIAGNOSIS — R33.9 URINARY RETENTION: ICD-10-CM

## 2021-08-10 DIAGNOSIS — F32.A ANXIETY AND DEPRESSION: Primary | ICD-10-CM

## 2021-08-10 DIAGNOSIS — Z11.59 NEED FOR HEPATITIS C SCREENING TEST: ICD-10-CM

## 2021-08-10 DIAGNOSIS — R63.4 WEIGHT LOSS: ICD-10-CM

## 2021-08-10 DIAGNOSIS — M05.79 RHEUMATOID ARTHRITIS INVOLVING MULTIPLE SITES WITH POSITIVE RHEUMATOID FACTOR (HCC): ICD-10-CM

## 2021-08-10 DIAGNOSIS — Z79.899 MEDICATION MANAGEMENT: ICD-10-CM

## 2021-08-10 DIAGNOSIS — M25.562 CHRONIC PAIN OF BOTH KNEES: ICD-10-CM

## 2021-08-10 DIAGNOSIS — E78.5 DYSLIPIDEMIA: ICD-10-CM

## 2021-08-10 DIAGNOSIS — M25.561 CHRONIC PAIN OF BOTH KNEES: ICD-10-CM

## 2021-08-10 DIAGNOSIS — Z23 IMMUNIZATION DUE: ICD-10-CM

## 2021-08-10 DIAGNOSIS — M25.421 EFFUSION OF RIGHT ELBOW: ICD-10-CM

## 2021-08-10 DIAGNOSIS — G44.209 ACUTE NON INTRACTABLE TENSION-TYPE HEADACHE: ICD-10-CM

## 2021-08-10 DIAGNOSIS — F41.9 ANXIETY AND DEPRESSION: Primary | ICD-10-CM

## 2021-08-10 DIAGNOSIS — G89.29 CHRONIC PAIN OF BOTH KNEES: ICD-10-CM

## 2021-08-10 PROCEDURE — 99214 OFFICE O/P EST MOD 30 MIN: CPT | Performed by: FAMILY MEDICINE

## 2021-08-10 PROCEDURE — 81003 URINALYSIS AUTO W/O SCOPE: CPT | Performed by: FAMILY MEDICINE

## 2021-08-11 ENCOUNTER — CLINICAL SUPPORT (OUTPATIENT)
Dept: FAMILY MEDICINE CLINIC | Facility: CLINIC | Age: 61
End: 2021-08-11

## 2021-08-11 DIAGNOSIS — R33.9 URINARY RETENTION: ICD-10-CM

## 2021-08-11 DIAGNOSIS — Z79.899 MEDICATION MANAGEMENT: Primary | ICD-10-CM

## 2021-08-11 LAB
25(OH)D3 SERPL-MCNC: 26.3 NG/ML (ref 30–100)
ALBUMIN SERPL-MCNC: 4.9 G/DL (ref 3.5–5.2)
ALBUMIN/GLOB SERPL: 2 G/DL
ALP SERPL-CCNC: 124 U/L (ref 39–117)
ALT SERPL W P-5'-P-CCNC: 19 U/L (ref 1–33)
ANION GAP SERPL CALCULATED.3IONS-SCNC: 11.2 MMOL/L (ref 5–15)
AST SERPL-CCNC: 25 U/L (ref 1–32)
BASOPHILS # BLD MANUAL: 0.07 10*3/MM3 (ref 0–0.2)
BASOPHILS NFR BLD AUTO: 1 % (ref 0–1.5)
BILIRUB BLD-MCNC: NEGATIVE MG/DL
BILIRUB SERPL-MCNC: 0.3 MG/DL (ref 0–1.2)
BUN SERPL-MCNC: 12 MG/DL (ref 8–23)
BUN/CREAT SERPL: 17.6 (ref 7–25)
CALCIUM SPEC-SCNC: 9.3 MG/DL (ref 8.6–10.5)
CHLORIDE SERPL-SCNC: 101 MMOL/L (ref 98–107)
CHOLEST SERPL-MCNC: 194 MG/DL (ref 0–200)
CLARITY, POC: CLEAR
CO2 SERPL-SCNC: 26.8 MMOL/L (ref 22–29)
COLOR UR: YELLOW
CREAT SERPL-MCNC: 0.68 MG/DL (ref 0.57–1)
DEPRECATED RDW RBC AUTO: 40.4 FL (ref 37–54)
EOSINOPHIL # BLD MANUAL: 0.26 10*3/MM3 (ref 0–0.4)
EOSINOPHIL NFR BLD MANUAL: 4 % (ref 0.3–6.2)
ERYTHROCYTE [DISTWIDTH] IN BLOOD BY AUTOMATED COUNT: 12.8 % (ref 12.3–15.4)
GFR SERPL CREATININE-BSD FRML MDRD: 87 ML/MIN/1.73
GLOBULIN UR ELPH-MCNC: 2.5 GM/DL
GLUCOSE SERPL-MCNC: 84 MG/DL (ref 65–99)
GLUCOSE UR STRIP-MCNC: NEGATIVE MG/DL
HCT VFR BLD AUTO: 39.6 % (ref 34–46.6)
HCV AB SER DONR QL: NORMAL
HDLC SERPL-MCNC: 71 MG/DL (ref 40–60)
HGB BLD-MCNC: 13.3 G/DL (ref 12–15.9)
KETONES UR QL: NEGATIVE
LDLC SERPL CALC-MCNC: 103 MG/DL (ref 0–100)
LDLC/HDLC SERPL: 1.41 {RATIO}
LEUKOCYTE EST, POC: NEGATIVE
LYMPHOCYTES # BLD MANUAL: 3.72 10*3/MM3 (ref 0.7–3.1)
LYMPHOCYTES NFR BLD MANUAL: 13 % (ref 5–12)
LYMPHOCYTES NFR BLD MANUAL: 57 % (ref 19.6–45.3)
MCH RBC QN AUTO: 29.8 PG (ref 26.6–33)
MCHC RBC AUTO-ENTMCNC: 33.6 G/DL (ref 31.5–35.7)
MCV RBC AUTO: 88.6 FL (ref 79–97)
MONOCYTES # BLD AUTO: 0.85 10*3/MM3 (ref 0.1–0.9)
NEUTROPHILS # BLD AUTO: 1.63 10*3/MM3 (ref 1.7–7)
NEUTROPHILS NFR BLD MANUAL: 25 % (ref 42.7–76)
NITRITE UR-MCNC: NEGATIVE MG/ML
NRBC BLD AUTO-RTO: 0 /100 WBC (ref 0–0.2)
PH UR: 6.5 [PH] (ref 5–8)
PLAT MORPH BLD: NORMAL
PLATELET # BLD AUTO: 267 10*3/MM3 (ref 140–450)
PMV BLD AUTO: 10.4 FL (ref 6–12)
POC AMPHETAMINES: NEGATIVE
POC BARBITURATES: NEGATIVE
POC BENZODIAZEPHINES: NEGATIVE
POC COCAINE: NEGATIVE
POC METHADONE: NEGATIVE
POC METHAMPHETAMINE SCREEN URINE: NEGATIVE
POC OPIATES: NEGATIVE
POC OXYCODONE: POSITIVE
POC PHENCYCLIDINE: NEGATIVE
POC PROPOXYPHENE: NEGATIVE
POC THC: NEGATIVE
POC TRICYCLIC ANTIDEPRESSANTS: NEGATIVE
POTASSIUM SERPL-SCNC: 4.7 MMOL/L (ref 3.5–5.2)
PROT SERPL-MCNC: 7.4 G/DL (ref 6–8.5)
PROT UR STRIP-MCNC: NEGATIVE MG/DL
RBC # BLD AUTO: 4.47 10*6/MM3 (ref 3.77–5.28)
RBC # UR STRIP: NEGATIVE /UL
RBC MORPH BLD: NORMAL
SODIUM SERPL-SCNC: 139 MMOL/L (ref 136–145)
SP GR UR: 1.02 (ref 1–1.03)
T4 FREE SERPL-MCNC: 1.2 NG/DL (ref 0.93–1.7)
TRIGL SERPL-MCNC: 113 MG/DL (ref 0–150)
TSH SERPL DL<=0.05 MIU/L-ACNC: 1.65 UIU/ML (ref 0.27–4.2)
UROBILINOGEN UR QL: NORMAL
VLDLC SERPL-MCNC: 20 MG/DL (ref 5–40)
WBC # BLD AUTO: 6.52 10*3/MM3 (ref 3.4–10.8)
WBC MORPH BLD: NORMAL

## 2021-08-11 PROCEDURE — 80305 DRUG TEST PRSMV DIR OPT OBS: CPT | Performed by: FAMILY MEDICINE

## 2021-08-11 PROCEDURE — U0003 INFECTIOUS AGENT DETECTION BY NUCLEIC ACID (DNA OR RNA); SEVERE ACUTE RESPIRATORY SYNDROME CORONAVIRUS 2 (SARS-COV-2) (CORONAVIRUS DISEASE [COVID-19]), AMPLIFIED PROBE TECHNIQUE, MAKING USE OF HIGH THROUGHPUT TECHNOLOGIES AS DESCRIBED BY CMS-2020-01-R: HCPCS | Performed by: FAMILY MEDICINE

## 2021-08-11 PROCEDURE — 84439 ASSAY OF FREE THYROXINE: CPT | Performed by: FAMILY MEDICINE

## 2021-08-11 PROCEDURE — 85007 BL SMEAR W/DIFF WBC COUNT: CPT | Performed by: FAMILY MEDICINE

## 2021-08-11 PROCEDURE — 80061 LIPID PANEL: CPT | Performed by: FAMILY MEDICINE

## 2021-08-11 PROCEDURE — 82306 VITAMIN D 25 HYDROXY: CPT | Performed by: FAMILY MEDICINE

## 2021-08-11 PROCEDURE — 84443 ASSAY THYROID STIM HORMONE: CPT | Performed by: FAMILY MEDICINE

## 2021-08-11 PROCEDURE — 86803 HEPATITIS C AB TEST: CPT | Performed by: FAMILY MEDICINE

## 2021-08-11 PROCEDURE — 87086 URINE CULTURE/COLONY COUNT: CPT | Performed by: FAMILY MEDICINE

## 2021-08-11 PROCEDURE — 85025 COMPLETE CBC W/AUTO DIFF WBC: CPT | Performed by: FAMILY MEDICINE

## 2021-08-11 PROCEDURE — 80053 COMPREHEN METABOLIC PANEL: CPT | Performed by: FAMILY MEDICINE

## 2021-08-12 LAB
LABCORP SARS-COV-2, NAA 2 DAY TAT: NORMAL
SARS-COV-2 RNA RESP QL NAA+PROBE: NOT DETECTED

## 2021-08-13 DIAGNOSIS — R73.03 PREDIABETES: Primary | ICD-10-CM

## 2021-08-13 LAB — BACTERIA SPEC AEROBE CULT: NO GROWTH

## 2021-08-13 RX ORDER — BLOOD-GLUCOSE METER
1 EACH MISCELLANEOUS DAILY
Qty: 1 EACH | Refills: 0 | Status: SHIPPED | OUTPATIENT
Start: 2021-08-13 | End: 2021-09-17 | Stop reason: SDUPTHER

## 2021-08-13 RX ORDER — CALCIUM CITRATE/VITAMIN D3 200MG-6.25
TABLET ORAL
Qty: 100 EACH | Refills: 0 | Status: SHIPPED | OUTPATIENT
Start: 2021-08-13 | End: 2021-09-27 | Stop reason: SDUPTHER

## 2021-08-13 RX ORDER — GLUCOSAM/CHON-MSM1/C/MANG/BOSW 500-416.6
1 TABLET ORAL DAILY
Qty: 100 EACH | Refills: 0 | Status: SHIPPED | OUTPATIENT
Start: 2021-08-13

## 2021-08-13 NOTE — TELEPHONE ENCOUNTER
Rx Refill Note  Requested Prescriptions     Pending Prescriptions Disp Refills   • glucose blood (True Metrix Blood Glucose Test) test strip 100 each 0     Sig: TEST BLOOD GLUCOSE QD   • Blood Glucose Monitoring Suppl (True Metrix Meter) device 1 each 0     Si each Daily.   • TRUEplus Lancets 33G misc 100 each 0     Si each Daily.      Last office visit with prescribing clinician: 2020      Next office visit with prescribing clinician: 2021            Shaunna Martínez LPN  21, 10:26 EDT

## 2021-08-16 ENCOUNTER — TELEPHONE (OUTPATIENT)
Dept: FAMILY MEDICINE CLINIC | Facility: CLINIC | Age: 61
End: 2021-08-16

## 2021-08-16 NOTE — TELEPHONE ENCOUNTER
Rx Refill Note  Requested Prescriptions      No prescriptions requested or ordered in this encounter      Last office visit with prescribing clinician: 12/23/2020      Next office visit with prescribing clinician: 11/9/2021            Courtney Andrews  08/16/21, 09:28 EDT

## 2021-08-17 NOTE — TELEPHONE ENCOUNTER
PATIENT DOES NOT WANT TO GO TO [place] FOR ORTHO. SHE IS REQUESTING TO GO TO [facility] IN [place], AND BE SEEN BY DR. COSTELLO. SHE ADVISED THAT SHE HAS SEEN HIM YEARS AGO, AND WOULD LIKE TO GO BACK TO SEE HIM. REFERRAL SENT TO [facility].

## 2021-08-20 RX ORDER — ESOMEPRAZOLE MAGNESIUM 40 MG/1
CAPSULE, DELAYED RELEASE ORAL
Qty: 180 CAPSULE | Refills: 0 | Status: SHIPPED | OUTPATIENT
Start: 2021-08-20 | End: 2021-09-02

## 2021-08-20 RX ORDER — NYSTATIN 100000 [USP'U]/G
POWDER TOPICAL 3 TIMES DAILY PRN
Qty: 30 G | Refills: 0 | Status: SHIPPED | OUTPATIENT
Start: 2021-08-20 | End: 2021-10-06

## 2021-08-23 ENCOUNTER — TELEPHONE (OUTPATIENT)
Dept: FAMILY MEDICINE CLINIC | Facility: CLINIC | Age: 61
End: 2021-08-23

## 2021-08-23 DIAGNOSIS — M05.9 RHEUMATOID ARTHRITIS WITH POSITIVE RHEUMATOID FACTOR, INVOLVING UNSPECIFIED SITE (HCC): ICD-10-CM

## 2021-08-23 DIAGNOSIS — M48.00 SPINAL STENOSIS, UNSPECIFIED SPINAL REGION: ICD-10-CM

## 2021-08-23 DIAGNOSIS — G89.29 OTHER CHRONIC PAIN: ICD-10-CM

## 2021-08-23 NOTE — TELEPHONE ENCOUNTER
Rx Refill Note    INSPECT UPDATED ON 8- AND IS SCANNED INTO CHART.     Requested Prescriptions     Pending Prescriptions Disp Refills   • oxyCODONE-acetaminophen (Percocet)  MG per tablet 30 tablet 0     Sig: Take 1 tablet by mouth Every 6 (Six) Hours As Needed for Moderate Pain .      Last office visit with prescribing clinician: 8-   Next office visit with prescribing clinician: 11/9/2021            Shaunna Martínez LPN  08/23/21, 16:43 EDT

## 2021-08-23 NOTE — TELEPHONE ENCOUNTER
Caller: An Harmon    Relationship: Self    Best call back number: [phone]    Medication needed:   Requested Prescriptions     Pending Prescriptions Disp Refills   • oxyCODONE-acetaminophen (Percocet)  MG per tablet 30 tablet 0     Sig: Take 1 tablet by mouth Every 6 (Six) Hours As Needed for Moderate Pain .       When do you need the refill by: 8/23/21    Does the patient have less than a 3 day supply:  [x] Yes  [] No    What is the patient's preferred pharmacy: [address] [phone] [facility] [phone]

## 2021-08-24 RX ORDER — OXYCODONE AND ACETAMINOPHEN 10; 325 MG/1; MG/1
1 TABLET ORAL EVERY 6 HOURS PRN
Qty: 30 TABLET | Refills: 0 | OUTPATIENT
Start: 2021-08-24

## 2021-08-24 NOTE — TELEPHONE ENCOUNTER
"I believe that she was discharged from Pain Mgmt at [facility] due to an issue with her pill count. When I reviewed the INSPECT, it showed that the one written by Paula Miranda, it was written in May, but not filled until June. [facility] had been filling them, and I believe wanted patient to be evaluated by you for us to continue those. She was referred to our PM here (Dr. [facility]), but they only offered her injections, and she advised them that she did not want to get just \"injections.\" The PM referral also stated that patient was discharged from previous pain ctr due to failed pill count, and that they would only see her for injections. Please see 7-6-2021 refill request under encounters.   "

## 2021-08-24 NOTE — TELEPHONE ENCOUNTER
"I CALLED AND S/W PATIENT. PATIENT WAS ADVISED BY THIS NURSE THAT SHE NEEDS TO SEEK NEW PAIN MGMT PHYSICIAN THAT WOULD ACCEPT HER, AND THAT WE COULD SEND A REFERRAL THERE, AND ONCE SHE GOT AN APPT WITH PAIN MGMT THAT DR. MABRY WOULD SEND OVER A SHORT TERM AND LIMITED SUPPLY OF PAIN MEDICATION. PATIENT WAS ADVISED THAT DR. CUMMINS UP IN [place] WOULD BE AN OPTION FOR HER. PATIENT IMMEDIATELY STATED, \"I'VE SEEN HIM BEFORE, AND I DIDN'T CARE FOR HIM.\" PATIENT WAS ADVISED THAT SHE WOULD NEED TO FIND A PAIN MGMT PHYSICIAN THAT SHE COULD SEE, KEEP HER APPTS, AND GO TO ANY PILL COUNTS THAT WERE DEEMED REQUIRED BY THEM. PATIENT BECAME UPSET AND AGITATED ASKING, \"WELL, HOW MANY IS SHE GOING TO SEND ME IN?!\" PATIENT WAS ADVISED THAT THERE IS NOT A NUMBER OF PAIN TABLETS THAT HAVE BEEN SENT YET, AND THAT SHE NEEDS TO FIND A PAIN CLINIC THAT WOULD ACCEPT HER AND THAT SHE COULD CONTACT HER INSURANCE IF NEED BE TO FIND MORE PROVIDERS IN THIS AREA. PATIENT THEN INQUIRED FOR NP ROBERT. SHE WAS ADVISED THAT NP ROBERT WAS IN CLINIC AND WAS WITH PATIENTS AT THIS TIME. PATIENT STATED, \"I WANT HER TO CALL ME.\" PATIENT WAS ADVISED THAT TYPICALLY PROVIDER ROBERT DOES NOT DO THAT UNLESS IT IS AN URGENT MATTER. PATIENT STATED, \"WELL SHE HAS BEFORE.\" PATIENT ONCE AGAIN ASKED, \"WELL, HOW MANY IS SHE GOING TO SEND IN BECAUSE I AM OUT?\" THIS NURSE ADVISED PT THAT HER REFILL REQUEST JUST CAME INTO THE OFFICE YESTERDAY AT 3:59 PM AND THAT ALL NARCOTIC REQUESTS REQUIRE 72 BUSINESS HOURS PER THE CONTROLLED SUBSTANCE AGREEMENT. PATIENT ASKED AGAIN HOW MANY PAIN TABLETS WOULD BE SENT. PATIENT WAS GAVE THE SAME ANSWER AS THE TIMES BEFORE (DEPENDENT ON WHEN HER APPT WITH PAIN MGMT WOULD BE). PATIENT THEN HUNG UP ON THIS NURSE.   "

## 2021-08-24 NOTE — TELEPHONE ENCOUNTER
Caller: An Harmon    Relationship: Self    Best call back number: [phone]     Medication needed:   Requested Prescriptions     Pending Prescriptions Disp Refills   • oxyCODONE-acetaminophen (Percocet)  MG per tablet 30 tablet 0     Sig: Take 1 tablet by mouth Every 6 (Six) Hours As Needed for Moderate Pain .       When do you need the refill by: ASAP    What additional details did the patient provide when requesting the medication: THE PATIENT IS CALLING TO CHECK ON THE STATUS OF HER MEDICATION REFILL.     Does the patient have less than a 3 day supply:  [x] Yes  [] No    What is the patient's preferred pharmacy: [facility] [address] [place] [address] [place]ITT  [phone] [facility] [phone] FX

## 2021-08-24 NOTE — PROGRESS NOTES
Subjective   An Harmon is a 61 y.o. female.     Chief Complaint   Patient presents with   • Anxiety   • Depression   • Hypertension   • Follow-up       History of Present Illness   Current hx RA, DDD, cervical,lumbar, thoracic, spinal stenosis, RA, chronic back pain  Anxiety, Depression, HTN, Morbid obesity  Pt c/o right elbow pain and    swelling , needs referral to ortho  Pt c/o chronic pain in both knees   current Hx urinary retention, needs catheters  She was previously followed by pain management but was recently discharged due her not being able to come in for a pill count    The following portions of the patient's history were reviewed and updated as appropriate: allergies, current medications, past family history, past medical history, past social history, past surgical history and problem list.    Past Medical History:   Diagnosis Date   • Allergic    • Anxiety    • Arthritis    • Asthma    • Depression    • GERD (gastroesophageal reflux disease)    • Hypertension    • Kyphosis deformity of spine    • Low back pain    • Obesity    • Osteopenia     osteoporosis   • Scoliosis deformity of spine        Past Surgical History:   Procedure Laterality Date   • APPENDECTOMY         Family History   Problem Relation Age of Onset   • Cancer Mother    • Diabetes Mother    • Heart disease Mother    • Cancer Father    • COPD Father    • Cancer Sister        Review of Systems   Constitutional: Negative for fatigue, unexpected weight gain and unexpected weight loss.   HENT: Positive for congestion. Negative for sinus pressure and sore throat.         Normal smell/taste   Eyes: Negative for blurred vision and visual disturbance.   Respiratory: Negative for cough, shortness of breath and wheezing.    Cardiovascular: Negative for chest pain, palpitations and leg swelling.   Gastrointestinal: Negative for abdominal pain, constipation, diarrhea, nausea, vomiting, GERD and indigestion.   Genitourinary: Positive for pelvic  pressure and urgency.   Musculoskeletal: Positive for arthralgias, back pain, gait problem and neck pain. Negative for joint swelling.   Skin: Negative for rash, skin lesions and bruise.   Allergic/Immunologic: Negative for environmental allergies.   Neurological: Negative for dizziness, tremors, syncope, weakness, light-headedness, headache and memory problem.   Psychiatric/Behavioral: Positive for stress. Negative for sleep disturbance and depressed mood. The patient is nervous/anxious.        Objective   Physical Exam  Vitals and nursing note reviewed.   Constitutional:       General: She is not in acute distress.     Appearance: She is well-developed. She is not diaphoretic.   HENT:      Head: Normocephalic and atraumatic.      Right Ear: External ear normal.      Left Ear: External ear normal.      Nose: Nose normal.   Eyes:      Conjunctiva/sclera: Conjunctivae normal.      Pupils: Pupils are equal, round, and reactive to light.   Neck:      Thyroid: No thyromegaly.   Cardiovascular:      Rate and Rhythm: Normal rate and regular rhythm.      Heart sounds: Normal heart sounds. No murmur heard.   No friction rub. No gallop.    Pulmonary:      Effort: Pulmonary effort is normal.      Breath sounds: Normal breath sounds. No wheezing.   Abdominal:      General: Bowel sounds are normal.      Palpations: Abdomen is soft.      Tenderness: There is no abdominal tenderness.   Musculoskeletal:         General: No tenderness or deformity. Normal range of motion.      Cervical back: Normal range of motion and neck supple.      Comments: Limited rom  kyphosis   Lymphadenopathy:      Cervical: No cervical adenopathy.   Skin:     General: Skin is warm and dry.      Capillary Refill: Capillary refill takes less than 2 seconds.      Findings: No rash.   Neurological:      Mental Status: She is alert and oriented to person, place, and time.      Cranial Nerves: No cranial nerve deficit.      Gait: Gait abnormal.   Psychiatric:          Behavior: Behavior normal.         Thought Content: Thought content normal.         Judgment: Judgment normal.         Vitals:    08/10/21 1623   BP: 137/78   Pulse: 80   Temp: 96.9 °F (36.1 °C)   SpO2: 95%     Body mass index is 37.2 kg/m².    LDL Cholesterol    Date Value Ref Range Status   08/11/2021 103 (H) 0 - 100 mg/dL Final     TSH   Date Value Ref Range Status   08/11/2021 1.650 0.270 - 4.200 uIU/mL Final     Results for orders placed or performed in visit on 08/11/21   POC Urine Drug Screen, Triage    Specimen: Urine   Result Value Ref Range    Methamphetaine Screen, Urine Negative Negative    POC Amphetamines Negative Negative    Barbiturates Screen Negative Negative    Benzodiazepine Screen Negative Negative    Cocaine Screen Negative Negative    Methadone Screen Negative Negative    Opiate Screen Negative Negative    Oxycodone, Screen Positive (A) Negative    Phencyclidine (PCP) Screen Negative Negative    Propoxyphene Screen Negative Negative    THC, Screen Negative Negative    Tricyclic Antidepressants Screen Negative Negative   Urine Culture - Urine, Urine, Clean Catch    Specimen: Urine, Clean Catch   Result Value Ref Range    Urine Culture No growth    Results for orders placed or performed in visit on 08/10/21   POC Urinalysis Dipstick, Automated    Specimen: Urine   Result Value Ref Range    Color Yellow Yellow, Straw, Dark Yellow, Kellen    Clarity, UA Clear Clear    Specific Gravity  1.020 1.005 - 1.030    pH, Urine 6.5 5.0 - 8.0    Leukocytes Negative (A) Negative    Nitrite, UA Negative Negative    Protein, POC Negative Negative mg/dL    Glucose, UA Negative Negative, 1000 mg/dL (3+) mg/dL    Ketones, UA Negative Negative    Urobilinogen, UA Normal Normal    Bilirubin Negative Negative    Blood, UA Negative Negative   SARS-CoV-2, ANTONIO 2 DAY TAT - Swab, Nasopharynx    Specimen: Nasopharynx; Swab   Result Value Ref Range    [facility] SARS-COV-2, ANTONIO 2 DAY TAT Performed    COVID-19,[facility]  ROUTINE, NP/OP SWAB IN TRANSPORT MEDIA OR ESWAB 72 HR TAT - Swab, Nasopharynx    Specimen: Nasopharynx; Swab   Result Value Ref Range    SARS-CoV-2, ANTONIO Not Detected Not Detected   TSH+Free T4    Specimen: Arm, Right; Blood   Result Value Ref Range    TSH 1.650 0.270 - 4.200 uIU/mL    Free T4 1.20 0.93 - 1.70 ng/dL   CBC Auto Differential    Specimen: Arm, Right; Blood   Result Value Ref Range    WBC 6.52 3.40 - 10.80 10*3/mm3    RBC 4.47 3.77 - 5.28 10*6/mm3    Hemoglobin 13.3 12.0 - 15.9 g/dL    Hematocrit 39.6 34.0 - 46.6 %    MCV 88.6 79.0 - 97.0 fL    MCH 29.8 26.6 - 33.0 pg    MCHC 33.6 31.5 - 35.7 g/dL    RDW 12.8 12.3 - 15.4 %    RDW-SD 40.4 37.0 - 54.0 fl    MPV 10.4 6.0 - 12.0 fL    Platelets 267 140 - 450 10*3/mm3    nRBC 0.0 0.0 - 0.2 /100 WBC   Hepatitis C antibody    Specimen: Arm, Right; Blood   Result Value Ref Range    Hepatitis C Ab Non-Reactive Non-Reactive   Vitamin D 25 hydroxy    Specimen: Arm, Right; Blood   Result Value Ref Range    25 Hydroxy, Vitamin D 26.3 (L) 30.0 - 100.0 ng/ml   Manual Differential    Specimen: Arm, Right; Blood   Result Value Ref Range    Neutrophil % 25.0 (L) 42.7 - 76.0 %    Lymphocyte % 57.0 (H) 19.6 - 45.3 %    Monocyte % 13.0 (H) 5.0 - 12.0 %    Eosinophil % 4.0 0.3 - 6.2 %    Basophil % 1.0 0.0 - 1.5 %    Neutrophils Absolute 1.63 (L) 1.70 - 7.00 10*3/mm3    Lymphocytes Absolute 3.72 (H) 0.70 - 3.10 10*3/mm3    Monocytes Absolute 0.85 0.10 - 0.90 10*3/mm3    Eosinophils Absolute 0.26 0.00 - 0.40 10*3/mm3    Basophils Absolute 0.07 0.00 - 0.20 10*3/mm3    RBC Morphology Normal Normal    WBC Morphology Normal Normal    Platelet Morphology Normal Normal   Lipid Panel    Specimen: Arm, Right; Blood   Result Value Ref Range    Total Cholesterol 194 0 - 200 mg/dL    Triglycerides 113 0 - 150 mg/dL    HDL Cholesterol 71 (H) 40 - 60 mg/dL    LDL Cholesterol  103 (H) 0 - 100 mg/dL    VLDL Cholesterol 20 5 - 40 mg/dL    LDL/HDL Ratio 1.41    Comprehensive metabolic panel     Specimen: Arm, Right; Blood   Result Value Ref Range    Glucose 84 65 - 99 mg/dL    BUN 12 8 - 23 mg/dL    Creatinine 0.68 0.57 - 1.00 mg/dL    Sodium 139 136 - 145 mmol/L    Potassium 4.7 3.5 - 5.2 mmol/L    Chloride 101 98 - 107 mmol/L    CO2 26.8 22.0 - 29.0 mmol/L    Calcium 9.3 8.6 - 10.5 mg/dL    Total Protein 7.4 6.0 - 8.5 g/dL    Albumin 4.90 3.50 - 5.20 g/dL    ALT (SGPT) 19 1 - 33 U/L    AST (SGOT) 25 1 - 32 U/L    Alkaline Phosphatase 124 (H) 39 - 117 U/L    Total Bilirubin 0.3 0.0 - 1.2 mg/dL    eGFR Non African Amer 87 >60 mL/min/1.73    Globulin 2.5 gm/dL    A/G Ratio 2.0 g/dL    BUN/Creatinine Ratio 17.6 7.0 - 25.0    Anion Gap 11.2 5.0 - 15.0 mmol/L   Results for orders placed or performed in visit on 12/23/20   COVID-19,[facility] ROUTINE, NP/OP SWAB IN TRANSPORT MEDIA OR ESWAB 72 HR TAT - Swab, Nasopharynx    Specimen: Nasopharynx; Swab   Result Value Ref Range    SARS-CoV-2, ANTONIO Not Detected Not Detected   Results for orders placed or performed in visit on 11/07/19   POCT urinalysis dipstick, automated    Specimen: Urine   Result Value Ref Range    Color Yellow Yellow, Straw, Dark Yellow, Kellen    Clarity, UA Clear Clear    Specific Gravity  1.010 1.005 - 1.030    pH, Urine 7.0 5.0 - 8.0    Leukocytes Trace (A) Negative    Nitrite, UA Negative Negative    Protein, POC Negative Negative mg/dL    Glucose, UA Negative Negative, 1000 mg/dL (3+) mg/dL    Ketones, UA Negative Negative    Urobilinogen, UA Normal Normal    Bilirubin Negative Negative    Blood, UA Negative Negative   Urine Culture - Urine, Urine, Clean Catch    Specimen: Urine, Clean Catch   Result Value Ref Range    Urine Culture No growth        Assessment/Plan   Diagnoses and all orders for this visit:    1. Anxiety and depression (Primary)    2. Essential hypertension    3. Effusion of right elbow  -     Ambulatory Referral to Orthopedic Surgery    4. Chronic pain of both knees  -     Ambulatory Referral to Orthopedic Surgery  -      Ambulatory Referral to Pain Management    5. Medication management  -     CBC & Differential  -     Comprehensive metabolic panel; Future  -     Urine Drug Screen - Urine, Clean Catch; Future  -     Comprehensive metabolic panel  -     Cancel: Manual Differential  -     Manual Differential    6. Vitamin D deficiency  -     Vitamin D 25 hydroxy; Future  -     Vitamin D 25 hydroxy    7. Weight loss  -     TSH+Free T4    8. Dysuria  -     POC Urinalysis Dipstick, Automated    9. Need for hepatitis C screening test  -     Hepatitis C antibody; Future  -     Hepatitis C antibody    10. Prediabetes  -     MicroAlbumin, Urine, Random - Urine, Clean Catch; Future    11. Dyslipidemia  -     Lipid Panel; Future  -     Lipid Panel    12. Immunization due  -     pneumococcal polysaccharide 23-valent (PNEUMOVAX-23) vaccine 0.5 mL    13. Urinary retention  -     Catheter Urethral Self FML 14FLF    14. Postmenopause  -     DEXA Bone Density Axial; Future    15. Rheumatoid arthritis involving multiple sites with positive rheumatoid factor (CMS/HCC)  -     Ambulatory Referral to Rheumatology    16. DDD (degenerative disc disease), lumbosacral    17. Chronic nasal congestion  -     Cancel: COVID-19,[facility],NP/OP Swab in Transport Media or ESwab 72 HR TAT - Swab, Oropharynx; Future  -     COVID-19,[facility],NP/OP Swab in Transport Media or ESwab 72 HR TAT - Swab, Nasopharynx; Future  -     COVID-19,[facility],NP/OP Swab in Transport Media or ESwab 72 HR TAT - Swab, Nasopharynx  -     SARS-CoV-2, ANTONIO 2 DAY TAT - Swab, Nasopharynx    18. Acute non intractable tension-type headache  -     COVID-19,[facility],NP/OP Swab in Transport Media or ESwab 72 HR TAT - Swab, Nasopharynx; Future  -     COVID-19,[facility],NP/OP Swab in Transport Media or ESwab 72 HR TAT - Swab, Nasopharynx  -     SARS-CoV-2, ANTONIO 2 DAY TAT - Swab, Nasopharynx      Fu labs  Refer rheumatology  Refer ortho  Refill meds

## 2021-08-25 ENCOUNTER — OFFICE VISIT (OUTPATIENT)
Dept: FAMILY MEDICINE CLINIC | Facility: CLINIC | Age: 61
End: 2021-08-25

## 2021-08-25 VITALS
BODY MASS INDEX: 38.8 KG/M2 | SYSTOLIC BLOOD PRESSURE: 134 MMHG | HEART RATE: 85 BPM | DIASTOLIC BLOOD PRESSURE: 83 MMHG | OXYGEN SATURATION: 94 % | WEIGHT: 219 LBS | TEMPERATURE: 97.1 F | HEIGHT: 63 IN

## 2021-08-25 DIAGNOSIS — M05.9 RHEUMATOID ARTHRITIS WITH POSITIVE RHEUMATOID FACTOR, INVOLVING UNSPECIFIED SITE (HCC): ICD-10-CM

## 2021-08-25 DIAGNOSIS — R33.9 URINARY RETENTION: Primary | ICD-10-CM

## 2021-08-25 DIAGNOSIS — M48.00 SPINAL STENOSIS, UNSPECIFIED SPINAL REGION: ICD-10-CM

## 2021-08-25 DIAGNOSIS — Z79.899 MEDICATION MANAGEMENT: ICD-10-CM

## 2021-08-25 DIAGNOSIS — G89.29 OTHER CHRONIC PAIN: ICD-10-CM

## 2021-08-25 LAB
BILIRUB BLD-MCNC: NEGATIVE MG/DL
BILIRUB BLD-MCNC: NEGATIVE MG/DL
CLARITY, POC: ABNORMAL
CLARITY, POC: CLEAR
COLOR UR: YELLOW
COLOR UR: YELLOW
GLUCOSE UR STRIP-MCNC: NEGATIVE MG/DL
GLUCOSE UR STRIP-MCNC: NEGATIVE MG/DL
KETONES UR QL: NEGATIVE
KETONES UR QL: NEGATIVE
LEUKOCYTE EST, POC: ABNORMAL
LEUKOCYTE EST, POC: ABNORMAL
NITRITE UR-MCNC: NEGATIVE MG/ML
NITRITE UR-MCNC: NEGATIVE MG/ML
PH UR: 6.5 [PH] (ref 5–8)
PH UR: 6.5 [PH] (ref 5–8)
POC AMPHETAMINES: NEGATIVE
POC BARBITURATES: NEGATIVE
POC BENZODIAZEPHINES: NEGATIVE
POC COCAINE: NEGATIVE
POC METHADONE: NEGATIVE
POC METHAMPHETAMINE SCREEN URINE: NEGATIVE
POC OPIATES: NEGATIVE
POC OXYCODONE: POSITIVE
POC PHENCYCLIDINE: NEGATIVE
POC PROPOXYPHENE: NEGATIVE
POC THC: NEGATIVE
POC TRICYCLIC ANTIDEPRESSANTS: NEGATIVE
PROT UR STRIP-MCNC: NEGATIVE MG/DL
PROT UR STRIP-MCNC: NEGATIVE MG/DL
RBC # UR STRIP: NEGATIVE /UL
RBC # UR STRIP: NEGATIVE /UL
SP GR UR: 1.01 (ref 1–1.03)
SP GR UR: 1.01 (ref 1–1.03)
UROBILINOGEN UR QL: NORMAL
UROBILINOGEN UR QL: NORMAL

## 2021-08-25 PROCEDURE — 99213 OFFICE O/P EST LOW 20 MIN: CPT | Performed by: NURSE PRACTITIONER

## 2021-08-25 PROCEDURE — 81003 URINALYSIS AUTO W/O SCOPE: CPT | Performed by: NURSE PRACTITIONER

## 2021-08-25 PROCEDURE — 80305 DRUG TEST PRSMV DIR OPT OBS: CPT | Performed by: NURSE PRACTITIONER

## 2021-08-25 RX ORDER — CEPHALEXIN 500 MG/1
500 CAPSULE ORAL 3 TIMES DAILY
Qty: 30 CAPSULE | Refills: 0 | Status: SHIPPED | OUTPATIENT
Start: 2021-08-25 | End: 2021-09-04

## 2021-08-25 RX ORDER — OXYCODONE AND ACETAMINOPHEN 10; 325 MG/1; MG/1
1 TABLET ORAL 2 TIMES DAILY PRN
Qty: 14 TABLET | Refills: 0 | Status: SHIPPED | OUTPATIENT
Start: 2021-08-25 | End: 2021-08-27 | Stop reason: SDUPTHER

## 2021-08-26 RX ORDER — CEPHALEXIN 500 MG/1
CAPSULE ORAL
Qty: 30 CAPSULE | Refills: 0 | OUTPATIENT
Start: 2021-08-26

## 2021-08-26 NOTE — TELEPHONE ENCOUNTER
Rx Refill Note  Requested Prescriptions     Pending Prescriptions Disp Refills   • baclofen (LIORESAL) 20 MG tablet [Pharmacy Med Name: BACLOFEN 20 MG Tablet] 360 tablet 0     Sig: TAKE 1 TABLET FOUR TIMES DAILY      Last office visit with prescribing clinician: 8/25/2021      Next office visit with prescribing clinician: 9/14/2021            Shaunna Martínez LPN  08/26/21, 12:49 EDT

## 2021-08-26 NOTE — TELEPHONE ENCOUNTER
They are refusing her anything except injections from [facility] and she informed me she can't go to [facility]. Does Dr Bhakta still see patients in [place]? As maybe he will see her and give her po meds  I saw her yesterday she can barely move around without meds  Dr Martinez can we fill them at a lower dosing scheduled until she sees the second pain clinic?

## 2021-08-27 ENCOUNTER — TELEPHONE (OUTPATIENT)
Dept: FAMILY MEDICINE CLINIC | Facility: CLINIC | Age: 61
End: 2021-08-27

## 2021-08-27 RX ORDER — BACLOFEN 20 MG/1
TABLET ORAL
Qty: 360 TABLET | Refills: 0 | Status: SHIPPED | OUTPATIENT
Start: 2021-08-27 | End: 2021-09-27 | Stop reason: SDUPTHER

## 2021-08-27 RX ORDER — OXYCODONE AND ACETAMINOPHEN 10; 325 MG/1; MG/1
1 TABLET ORAL EVERY 8 HOURS PRN
Qty: 21 TABLET | Refills: 0 | Status: SHIPPED | OUTPATIENT
Start: 2021-08-27 | End: 2021-09-03

## 2021-08-27 NOTE — TELEPHONE ENCOUNTER
I CALLED NAM AND SPOKE WITH HER AND SHE STATED THAT SHE ONLY WANTS TO TALK TO DANIEL ABOUT HER PAIN MEDICINE.  SHE STATED THAT SHE CAN'T LIVE ON 2 A DAY SHE HAS BEEN ON 4 A DAY FOR YEARS.  I TOLD HER THAT I WOULD GIVE DANIEL THE MESSAGE BUT COULDN'T PROMISE THAT SHE WOULD BE ABLE TO CALL HER BACK TODAY.

## 2021-08-27 NOTE — TELEPHONE ENCOUNTER
Caller: An Harmon    Relationship: Self    Best call back number: [phone]    Who are you requesting to speak with (clinical staff, provider,  specific staff member): DANIEL JONES    What was the call regarding: WOULD LIKE A CALL BACK WANTS TO SPEAK TO DANIEL     Do you require a callback: YES

## 2021-08-27 NOTE — TELEPHONE ENCOUNTER
It is going to be increased to TID i've already sent the script for one week to pharmacy  I spoke with Dr Martinez and that was what is decided at this time until we do her f/u and we need to have her see another pain clinic

## 2021-08-27 NOTE — TELEPHONE ENCOUNTER
CALLED AND SPOKE WITH NAM AND INFORMED HER OF WHAT DANIEL AND DR. MABRY HAD SAID.  SHE STATED THAT SHE UNDERSTOOD.

## 2021-08-30 DIAGNOSIS — F41.9 ANXIETY: ICD-10-CM

## 2021-08-30 DIAGNOSIS — J44.1 COPD WITH EXACERBATION (HCC): ICD-10-CM

## 2021-08-30 RX ORDER — METOPROLOL SUCCINATE 50 MG/1
50 TABLET, EXTENDED RELEASE ORAL DAILY
Qty: 90 TABLET | Refills: 1 | Status: CANCELLED | OUTPATIENT
Start: 2021-08-30

## 2021-08-30 RX ORDER — DOXEPIN HYDROCHLORIDE 25 MG/1
25 CAPSULE ORAL DAILY
Qty: 90 CAPSULE | Refills: 0 | Status: CANCELLED | OUTPATIENT
Start: 2021-08-30

## 2021-08-30 RX ORDER — MONTELUKAST SODIUM 10 MG/1
10 TABLET ORAL DAILY
Qty: 90 TABLET | Refills: 0 | Status: CANCELLED | OUTPATIENT
Start: 2021-08-30

## 2021-08-30 RX ORDER — BUSPIRONE HYDROCHLORIDE 10 MG/1
10 TABLET ORAL 3 TIMES DAILY
Qty: 270 TABLET | Refills: 0 | Status: CANCELLED | OUTPATIENT
Start: 2021-08-30

## 2021-08-30 RX ORDER — SUCRALFATE 1 G/1
1 TABLET ORAL 3 TIMES DAILY
Qty: 270 TABLET | Refills: 0 | Status: CANCELLED | OUTPATIENT
Start: 2021-08-30

## 2021-08-30 RX ORDER — ESOMEPRAZOLE MAGNESIUM 40 MG/1
40 CAPSULE, DELAYED RELEASE ORAL 2 TIMES DAILY
Qty: 180 CAPSULE | Refills: 0 | Status: CANCELLED | OUTPATIENT
Start: 2021-08-30

## 2021-08-30 RX ORDER — GABAPENTIN 800 MG/1
TABLET ORAL
Qty: 120 TABLET | Refills: 0 | Status: CANCELLED | OUTPATIENT
Start: 2021-08-30

## 2021-08-30 RX ORDER — AMITRIPTYLINE HYDROCHLORIDE 75 MG/1
75 TABLET, FILM COATED ORAL EVERY 24 HOURS
Qty: 30 TABLET | Refills: 0 | Status: CANCELLED | OUTPATIENT
Start: 2021-08-30

## 2021-08-31 RX ORDER — PRENATAL VIT 27,CALC/IRON/FA 60 MG-1 MG
TABLET ORAL
Qty: 90 TABLET | Refills: 1 | Status: SHIPPED | OUTPATIENT
Start: 2021-08-31

## 2021-08-31 NOTE — TELEPHONE ENCOUNTER
Rx Refill Note  Requested Prescriptions     Pending Prescriptions Disp Refills   • Prenatal Vit-Fe Fumarate-FA (Trinatal Rx 1) 60-1 MG tablet [Pharmacy Med Name: TRINATAL RX 1 60-1 MG Tablet] 90 tablet 1     Sig: TAKE 1 TABLET EVERY DAY      Last office visit with prescribing clinician: 8/25/2021      Next office visit with prescribing clinician: 9-           Shaunna Martínez LPN  08/31/21, 12:51 EDT

## 2021-08-31 NOTE — TELEPHONE ENCOUNTER
I HAD ADVISED PATIENT WHEN I SPOKE WITH HER ON THE PHONE RE: NEED TO FIND PM PHYSICIAN SINCE DR. RODRIGUEZ ONLY WANTED TO OFFER INJECTIONS. ADVISED HER OF DR. CUMMINS BEING LOCATED IN [place], AND PATIENT ADVISED THAT SHE HAS SEEN HIM BEFORE, AND THAT SHE DID NOT CARE FOR HIM. I HAD ADVISED PT AT THAT TIME THAT SHE WOULD NEED TO LET US KNOW OF A PM CLINIC THAT WOULD ACCEPT HER AS A PATIENT, AND THAT WE WOULD SEND THE REFERRAL TO THEM. SHE HUNG UP ON ME AFTER THAT.

## 2021-09-01 NOTE — TELEPHONE ENCOUNTER
Rx Refill Note  Requested Prescriptions      No prescriptions requested or ordered in this encounter      Last office visit with prescribing clinician: 8/25/2021      Next office visit with prescribing clinician: 9/14/2021            Courtney Quintanilla  09/01/21, 08:20 EDT

## 2021-09-02 RX ORDER — ESOMEPRAZOLE MAGNESIUM 40 MG/1
CAPSULE, DELAYED RELEASE ORAL
Qty: 180 CAPSULE | Refills: 0 | Status: SHIPPED | OUTPATIENT
Start: 2021-09-02

## 2021-09-02 RX ORDER — FEXOFENADINE HYDROCHLORIDE 60 MG/1
60 TABLET, FILM COATED ORAL DAILY
Status: CANCELLED | OUTPATIENT
Start: 2021-09-02

## 2021-09-02 RX ORDER — PROMETHAZINE HYDROCHLORIDE 25 MG/1
25 TABLET ORAL EVERY 12 HOURS PRN
Qty: 30 TABLET | Refills: 0 | Status: CANCELLED | OUTPATIENT
Start: 2021-09-02

## 2021-09-02 NOTE — TELEPHONE ENCOUNTER
Rx Refill Note  Requested Prescriptions     Pending Prescriptions Disp Refills   • esomeprazole (nexIUM) 40 MG capsule [Pharmacy Med Name: ESOMEPRAZOLE MAGNESIUM 40 MG Capsule Delayed Release] 180 capsule 0     Sig: TAKE 1 CAPSULE TWICE DAILY      Last office visit with prescribing clinician: 8/10/2021      Next office visit with prescribing clinician: Visit date not found            Brandon Brody  09/02/21, 16:31 EDT     APPR PER RX PROTOCOL

## 2021-09-02 NOTE — TELEPHONE ENCOUNTER
Rx Refill Note  Requested Prescriptions     Pending Prescriptions Disp Refills   • fexofenadine (Allegra Allergy) 60 MG tablet       Sig: Take 1 tablet by mouth Daily.   • promethazine (PHENERGAN) 25 MG tablet 30 tablet 0     Sig: Take 1 tablet by mouth Every 12 (Twelve) Hours As Needed for Nausea or Vomiting.      Last office visit with prescribing clinician: 8/25/2021      Next office visit with prescribing clinician: 9/14/2021            Brandon Brody  09/02/21, 16:34 EDT

## 2021-09-03 ENCOUNTER — TELEPHONE (OUTPATIENT)
Dept: FAMILY MEDICINE CLINIC | Facility: CLINIC | Age: 61
End: 2021-09-03

## 2021-09-03 NOTE — TELEPHONE ENCOUNTER
Can you have her move up her appt to next week  Dr Martinez and I both discussed it with her that we are writing the scripts until she gets into the pain clinic and we are seeing how she does with every 8 hours as she was taking it previously every 6 hours

## 2021-09-03 NOTE — TELEPHONE ENCOUNTER
Caller: An Harmon    Relationship: Self    Best call back number: [phone]     What is the best time to reach you: ANYTIME    Who are you requesting to speak with  DANIEL        What was the call regarding: PATIENT STATES SHE REALLY WOULD LIKE CALLBACK FROM DANIEL THIS EVENING NEEDS TO SPEAK WITH HER.      Do you require a callback: YES

## 2021-09-03 NOTE — TELEPHONE ENCOUNTER
Pt states that her pain medicine has been cut back and she is in too much pain and doesn't understand why her medication was cut back and wants to discuss why.

## 2021-09-07 RX ORDER — PROMETHAZINE HYDROCHLORIDE 25 MG/1
25 TABLET ORAL EVERY 12 HOURS PRN
Qty: 30 TABLET | Refills: 0 | Status: SHIPPED | OUTPATIENT
Start: 2021-09-07 | End: 2021-10-06

## 2021-09-07 NOTE — TELEPHONE ENCOUNTER
Rx Refill Note  Requested Prescriptions     Pending Prescriptions Disp Refills   • promethazine (PHENERGAN) 25 MG tablet 30 tablet 0     Sig: Take 1 tablet by mouth Every 12 (Twelve) Hours As Needed for Nausea or Vomiting.      Last office visit with prescribing clinician: 8/25/2021      Next office visit with prescribing clinician: 9/14/2021            Courtney Quintanilla Rep  09/07/21, 09:13 EDT

## 2021-09-07 NOTE — TELEPHONE ENCOUNTER
Caller: An Harmon    Relationship to patient: Self    Best call back number: [phone]    Patient is needing: PATIENT IS CALLING ONCE MORE IN REGARDS TO HER PAIN MEDICATION AND IT BEING CUT BACK. SHE STATED THAT SHE IS IN A LOT OF PAIN AND WANTS TO SPEAK TO TYLER VARELA. SHE IS ALSO ASKING FOR THE NUMBER FOR [facility] (SHE WAS UNSURE OF OFFICE NAME) SHE STATED THAT IT WAS AN ARTHRITIS DOCTOR AND THAT TYLER JONES HAS REFERRED HER THERE

## 2021-09-07 NOTE — TELEPHONE ENCOUNTER
Rx Refill Note  Requested Prescriptions     Pending Prescriptions Disp Refills   • promethazine (PHENERGAN) 25 MG tablet 30 tablet 0     Sig: Take 1 tablet by mouth Every 12 (Twelve) Hours As Needed for Nausea or Vomiting.      Last office visit with prescribing clinician: 8/25/2021      Next office visit with prescribing clinician: 9/14/2021     Office Visit with Haydee Anderson APRN (08/25/2021)  Lipid Panel (08/11/2021 07:56)  Hepatitis C antibody (08/11/2021 07:56)  Vitamin D 25 hydroxy (08/11/2021 07:56)  Comprehensive metabolic panel (08/11/2021 07:56)  TSH+Free T4 (08/11/2021 07:56)  CBC & Differential (08/11/2021 07:56)  Urine Drug Screen - Urine, Clean Catch (08/10/2021 17:07)  MicroAlbumin, Urine, Random - Urine, Clean Catch (08/10/2021 16:58)           Brandon Brody  09/07/21, 11:43 EDT

## 2021-09-13 ENCOUNTER — TELEPHONE (OUTPATIENT)
Dept: FAMILY MEDICINE CLINIC | Facility: CLINIC | Age: 61
End: 2021-09-13

## 2021-09-13 RX ORDER — OXYCODONE AND ACETAMINOPHEN 10; 325 MG/1; MG/1
1 TABLET ORAL EVERY 8 HOURS PRN
Qty: 21 TABLET | Refills: 0 | Status: SHIPPED | OUTPATIENT
Start: 2021-09-13 | End: 2021-09-17 | Stop reason: SDUPTHER

## 2021-09-13 NOTE — TELEPHONE ENCOUNTER
Ok, let her know Friday will send refill  If she has meds left she needs to bring them to her appt

## 2021-09-13 NOTE — TELEPHONE ENCOUNTER
Patient is requesting a refill on her Oxycodone, she did not make her last appointment I scheduled her for this coming Friday and told her she will need to be on time for this next appointment or it is possible dismissal from practice because she has no showed and has showed up late numerous of times. Please advise

## 2021-09-13 NOTE — TELEPHONE ENCOUNTER
We can not do a refill until her f/u is made  We had this discussion with her in office   Is she scheduled this week?

## 2021-09-17 ENCOUNTER — OFFICE VISIT (OUTPATIENT)
Dept: FAMILY MEDICINE CLINIC | Facility: CLINIC | Age: 61
End: 2021-09-17

## 2021-09-17 VITALS
WEIGHT: 220 LBS | TEMPERATURE: 98.7 F | OXYGEN SATURATION: 94 % | HEART RATE: 90 BPM | SYSTOLIC BLOOD PRESSURE: 130 MMHG | DIASTOLIC BLOOD PRESSURE: 81 MMHG | BODY MASS INDEX: 38.98 KG/M2 | HEIGHT: 63 IN

## 2021-09-17 DIAGNOSIS — M51.37 DDD (DEGENERATIVE DISC DISEASE), LUMBOSACRAL: ICD-10-CM

## 2021-09-17 DIAGNOSIS — M05.9 RHEUMATOID ARTHRITIS WITH POSITIVE RHEUMATOID FACTOR, INVOLVING UNSPECIFIED SITE (HCC): Primary | ICD-10-CM

## 2021-09-17 DIAGNOSIS — M05.79 RHEUMATOID ARTHRITIS INVOLVING MULTIPLE SITES WITH POSITIVE RHEUMATOID FACTOR (HCC): ICD-10-CM

## 2021-09-17 PROCEDURE — 99214 OFFICE O/P EST MOD 30 MIN: CPT | Performed by: NURSE PRACTITIONER

## 2021-09-17 RX ORDER — OXYCODONE AND ACETAMINOPHEN 10; 325 MG/1; MG/1
1 TABLET ORAL EVERY 6 HOURS PRN
Qty: 120 TABLET | Refills: 0 | Status: SHIPPED | OUTPATIENT
Start: 2021-09-17 | End: 2021-09-22 | Stop reason: SDUPTHER

## 2021-09-22 ENCOUNTER — TELEPHONE (OUTPATIENT)
Dept: FAMILY MEDICINE CLINIC | Facility: CLINIC | Age: 61
End: 2021-09-22

## 2021-09-22 DIAGNOSIS — M05.9 RHEUMATOID ARTHRITIS WITH POSITIVE RHEUMATOID FACTOR, INVOLVING UNSPECIFIED SITE (HCC): ICD-10-CM

## 2021-09-22 DIAGNOSIS — F41.9 ANXIETY: ICD-10-CM

## 2021-09-22 NOTE — TELEPHONE ENCOUNTER
Caller: An Harmon    Relationship: Self    Best call back number:     Caller requesting test results:     What test was performed: XRAY OF HANDS AND LEGS   AND LABS    When was the test performed: LAST WEEK     Where was the test performed:     Additional notes: PATIENT IS CALLING IN WANTING TO KNOW IF DR JONES HAS RECEIVED HER LAB AND XRAY RESULTS THAT SHE HAD DONE AT [facility] AT THE FAMILY MEDICINE OFFICE

## 2021-09-23 ENCOUNTER — TELEPHONE (OUTPATIENT)
Dept: FAMILY MEDICINE CLINIC | Facility: CLINIC | Age: 61
End: 2021-09-23

## 2021-09-23 NOTE — TELEPHONE ENCOUNTER
Pt called after hours with complaint severe headache for 2 days.  She feels like it is mostly like her usual migraines that she gets.  Due to her numerous allergies to medications and her blood pressure being high 180/110, I do not feel it is safe to send in a migraine medication for pt. Recommended that pt go to the hospital but she doesn't want to do that.  I told her she could also go to the urgent care and she was agreeable to that.  She will also follow up with her PCP to let her know.

## 2021-09-24 NOTE — TELEPHONE ENCOUNTER
Spoke with patient she states that she is still having migraines and sick to stomach. She doesn't know if its her blood pressure or her sinuses that's causing it.

## 2021-09-27 DIAGNOSIS — F41.9 ANXIETY: ICD-10-CM

## 2021-09-27 DIAGNOSIS — R73.03 PREDIABETES: ICD-10-CM

## 2021-09-27 RX ORDER — ISOPROPYL ALCOHOL 0.75 G/1
1 SWAB TOPICAL DAILY
COMMUNITY
End: 2021-09-27 | Stop reason: SDUPTHER

## 2021-10-01 RX ORDER — OXYCODONE AND ACETAMINOPHEN 10; 325 MG/1; MG/1
1 TABLET ORAL EVERY 6 HOURS PRN
Qty: 120 TABLET | Refills: 0 | Status: SHIPPED | OUTPATIENT
Start: 2021-10-01 | End: 2021-10-28 | Stop reason: SDUPTHER

## 2021-10-01 NOTE — TELEPHONE ENCOUNTER
DANIEL,   PLEASE ADVISE. I HAVE UPLOADED PATIENT'S RECENT INSPECT FROM TODAY (10/1).     THANK YOU!

## 2021-10-01 NOTE — TELEPHONE ENCOUNTER
PATIENT STATES SHE NEEDS TO KNOW WHEN SHE NEEDS TO FINISH THE PRESCRIPTIONS FROM THE LOCAL PHARMACY AND THE MAIL ORDER PHARMACY SO SHE DOESN'T RUN OUT TOO SOON BECAUSE SHE DOESN'T WANT ANY MISTAKES.    PLEASE ADVISE ASAP.    CONTACT: [phone] (h)

## 2021-10-04 ENCOUNTER — TELEPHONE (OUTPATIENT)
Dept: FAMILY MEDICINE CLINIC | Facility: CLINIC | Age: 61
End: 2021-10-04

## 2021-10-04 NOTE — TELEPHONE ENCOUNTER
Caller: An Harmon    Relationship: Self    Best call back number: [phone]    What medication are you requesting: SOMETHING TO HELP HER GET THROUGH HER SISTERS .      If a prescription is needed, what is your preferred pharmacy and phone number:      [facility] [address] [place] [phone]  [place]  [phone] [facility] [phone]       Additional notes:      PATIENT'S SISTER IS IN [facility] AND THEY ARE GOING TO BE TAKING HER OFF LIFE SUPPORT. SHE IS UNSURE OF THE DAY.     LINDA HUTCHISON IS THE PATIENT GETTING TAKEN OFF LIFE SUPPORT    PATIENT IS NEEDING SOME MEDICATION CALLED IN TO HELP HER GET THROUGH THESE NEXT FEW WEEKS WITH , ETC -       PLEASE CONTACT PATIENT UPON SEEING THIS

## 2021-10-04 NOTE — TELEPHONE ENCOUNTER
DANIEL,  PLEASE ADVISE. I HAVE UPDATED PATIENT'S INSPECT TODAY (10-4), AND HAVE SCANNED IT INTO HER CHART.     THANK YOU.

## 2021-10-05 NOTE — TELEPHONE ENCOUNTER
PATIENT CALLED ASKING ABOUT THIS MESSAGE, SHE SAID THAT KATHE TOOK LINDA OFF LIFE SUPPORT TODAY AND SHE DIDN'T GET TO GO DOWN AND SEE HER. PLEASE ADVISE

## 2021-10-05 NOTE — TELEPHONE ENCOUNTER
Due to her current amount of medication for anxiety she is taking the maximum dosing we would have to change medications   Has she taken buspar before?

## 2021-10-06 ENCOUNTER — TELEPHONE (OUTPATIENT)
Dept: FAMILY MEDICINE CLINIC | Facility: CLINIC | Age: 61
End: 2021-10-06

## 2021-10-06 RX ORDER — FEXOFENADINE HYDROCHLORIDE 60 MG/1
60 TABLET, FILM COATED ORAL DAILY
Qty: 90 TABLET | Refills: 0 | Status: SHIPPED | OUTPATIENT
Start: 2021-10-06 | End: 2021-11-18 | Stop reason: SDUPTHER

## 2021-10-06 RX ORDER — MONTELUKAST SODIUM 10 MG/1
10 TABLET ORAL DAILY
Qty: 90 TABLET | Refills: 0 | Status: SHIPPED | OUTPATIENT
Start: 2021-10-06 | End: 2021-11-17 | Stop reason: SDUPTHER

## 2021-10-06 RX ORDER — NYSTATIN 100000 [USP'U]/G
POWDER TOPICAL
Qty: 60 G | Refills: 0 | Status: SHIPPED | OUTPATIENT
Start: 2021-10-06 | End: 2021-11-17 | Stop reason: SDUPTHER

## 2021-10-06 RX ORDER — ISOPROPYL ALCOHOL 0.75 G/1
1 SWAB TOPICAL DAILY
Qty: 100 EACH | Refills: 0 | Status: SHIPPED | OUTPATIENT
Start: 2021-10-06

## 2021-10-06 RX ORDER — CALCIUM CITRATE/VITAMIN D3 200MG-6.25
TABLET ORAL
Qty: 100 EACH | Refills: 0 | Status: SHIPPED | OUTPATIENT
Start: 2021-10-06

## 2021-10-06 RX ORDER — DOXEPIN HYDROCHLORIDE 25 MG/1
25 CAPSULE ORAL DAILY
Qty: 90 CAPSULE | Refills: 0 | Status: SHIPPED | OUTPATIENT
Start: 2021-10-06 | End: 2021-11-29 | Stop reason: SDUPTHER

## 2021-10-06 RX ORDER — AMITRIPTYLINE HYDROCHLORIDE 75 MG/1
75 TABLET, FILM COATED ORAL EVERY 24 HOURS
Qty: 90 TABLET | Refills: 0 | Status: SHIPPED | OUTPATIENT
Start: 2021-10-06 | End: 2021-11-17 | Stop reason: SDUPTHER

## 2021-10-06 RX ORDER — GABAPENTIN 800 MG/1
TABLET ORAL
Qty: 360 TABLET | Refills: 0 | Status: SHIPPED | OUTPATIENT
Start: 2021-10-06 | End: 2021-11-17 | Stop reason: SDUPTHER

## 2021-10-06 RX ORDER — SUCRALFATE 1 G/1
1 TABLET ORAL 3 TIMES DAILY
Qty: 270 TABLET | Refills: 0 | Status: SHIPPED | OUTPATIENT
Start: 2021-10-06 | End: 2021-11-18 | Stop reason: SDUPTHER

## 2021-10-06 RX ORDER — METOPROLOL SUCCINATE 50 MG/1
50 TABLET, EXTENDED RELEASE ORAL DAILY
Qty: 90 TABLET | Refills: 0 | Status: SHIPPED | OUTPATIENT
Start: 2021-10-06

## 2021-10-06 RX ORDER — BUSPIRONE HYDROCHLORIDE 10 MG/1
10 TABLET ORAL 3 TIMES DAILY
Qty: 270 TABLET | Refills: 0 | Status: SHIPPED | OUTPATIENT
Start: 2021-10-06 | End: 2021-11-18 | Stop reason: SDUPTHER

## 2021-10-06 RX ORDER — ALBUTEROL SULFATE 90 UG/1
2 AEROSOL, METERED RESPIRATORY (INHALATION) EVERY 6 HOURS PRN
Qty: 24 G | Refills: 1 | Status: SHIPPED | OUTPATIENT
Start: 2021-10-06

## 2021-10-06 RX ORDER — DOXEPIN HYDROCHLORIDE 25 MG/1
CAPSULE ORAL
Qty: 90 CAPSULE | Refills: 0 | Status: SHIPPED | OUTPATIENT
Start: 2021-10-06 | End: 2021-11-17 | Stop reason: SDUPTHER

## 2021-10-06 RX ORDER — BACLOFEN 20 MG/1
20 TABLET ORAL 4 TIMES DAILY
Qty: 360 TABLET | Refills: 0 | Status: SHIPPED | OUTPATIENT
Start: 2021-10-06 | End: 2021-11-08

## 2021-10-06 RX ORDER — PROMETHAZINE HYDROCHLORIDE 25 MG/1
TABLET ORAL
Qty: 60 TABLET | Refills: 0 | Status: SHIPPED | OUTPATIENT
Start: 2021-10-06 | End: 2021-10-06 | Stop reason: SDUPTHER

## 2021-10-06 RX ORDER — HYDROXYZINE HYDROCHLORIDE 25 MG/1
TABLET, FILM COATED ORAL
Qty: 30 TABLET | Refills: 0 | Status: SHIPPED | OUTPATIENT
Start: 2021-10-06

## 2021-10-06 NOTE — TELEPHONE ENCOUNTER
Caller: An aHrmon    Relationship: Self      Medication requested (name and dosage): promethazine (PHENERGAN) 25 MG tablet    Pharmacy where request should be sent: [facility] [address] [place] [phone]  [place] - [phone] [facility] [phone]     Additional details provided by patient: PATIENT IS AT HER SISTER'S  AND NEEDS AN EMERGENCY AMOUNT TO BE SENT TO [facility] IN [place], [place].    Best call back number: [phone]    Does the patient have less than a 3 day supply:  [x] Yes  [] No    Courtney Guerrero Rep   10/06/21 13:38 EDT

## 2021-10-07 ENCOUNTER — TELEPHONE (OUTPATIENT)
Dept: FAMILY MEDICINE CLINIC | Facility: CLINIC | Age: 61
End: 2021-10-07

## 2021-10-07 RX ORDER — PROMETHAZINE HYDROCHLORIDE 25 MG/1
25 TABLET ORAL 2 TIMES DAILY PRN
Qty: 60 TABLET | Refills: 0 | Status: SHIPPED | OUTPATIENT
Start: 2021-10-07 | End: 2021-10-28 | Stop reason: SDUPTHER

## 2021-10-07 NOTE — TELEPHONE ENCOUNTER
I really feel at this time due to her other medications that she is currently taking it could increase her risk of s/e as she has been taking both buspar and atarax

## 2021-10-07 NOTE — TELEPHONE ENCOUNTER
Caller: An Harmon    Relationship to patient: Self    Best call back number: [phone] (H)    Patient is needing: PATIENT CALLED IN STATING THAT THE busPIRone (BUSPAR) 10 MG tablet MAKES HER FEEL SICK AND DIZZY. PATIENT WOULD LIKE TO SEE IF SOMETHING ELSE CAN BE CALLED IN FOR HER BEFORE HER SISTERS  ON 10/8/21. PLEASE ADVISE. THANK YOU.          [address] [place]  [phone] [facility] [phone]   [phone]

## 2021-10-13 NOTE — TELEPHONE ENCOUNTER
PATRICIA,  DO YOU MIND TO PLEASE CONTACT PATIENT AND LET HER KNOW THAT WE ARE SORRY FOR THE LOSS OF HER SISTER, AND ALSO SORRY IN THE DELAY OF GETTING BACK WITH HER, BUT DANIEL FEELS THAT AT THIS TIME DUE TO HER OTHER MEDICATIONS THAT SHE IS CURRENTLY TAKING, IT COULD BE AN INCREASED RISK FOR HER AS FAR AS SIDE EFFECTS GO SINCE SHE IS CURRENTLY TAKING THE BUSPAR AND ATARAX.     THANK YOU!

## 2021-10-14 ENCOUNTER — OFFICE VISIT (OUTPATIENT)
Dept: FAMILY MEDICINE CLINIC | Facility: CLINIC | Age: 61
End: 2021-10-14

## 2021-10-14 VITALS
SYSTOLIC BLOOD PRESSURE: 141 MMHG | BODY MASS INDEX: 39.69 KG/M2 | WEIGHT: 224 LBS | TEMPERATURE: 98.4 F | OXYGEN SATURATION: 92 % | HEART RATE: 70 BPM | DIASTOLIC BLOOD PRESSURE: 78 MMHG | HEIGHT: 63 IN | RESPIRATION RATE: 18 BRPM

## 2021-10-14 DIAGNOSIS — R06.02 SHORTNESS OF BREATH: ICD-10-CM

## 2021-10-14 DIAGNOSIS — S29.9XXA TRAUMATIC INJURY OF RIB: ICD-10-CM

## 2021-10-14 DIAGNOSIS — Z12.31 ENCOUNTER FOR SCREENING MAMMOGRAM FOR MALIGNANT NEOPLASM OF BREAST: Primary | ICD-10-CM

## 2021-10-14 PROBLEM — J45.31 MILD PERSISTENT ASTHMA WITH (ACUTE) EXACERBATION: Status: ACTIVE | Noted: 2017-06-21

## 2021-10-14 PROBLEM — G47.34 IDIOPATHIC SLEEP RELATED NONOBSTRUCTIVE ALVEOLAR HYPOVENTILATION: Status: ACTIVE | Noted: 2017-04-24

## 2021-10-14 PROBLEM — Z82.61 FAMILY HISTORY OF RHEUMATOID ARTHRITIS: Status: ACTIVE | Noted: 2021-09-14

## 2021-10-14 PROBLEM — K57.32 DIVERTICULITIS OF COLON: Status: ACTIVE | Noted: 2018-02-10

## 2021-10-14 PROBLEM — G60.9 IDIOPATHIC PERIPHERAL NEUROPATHY: Status: ACTIVE | Noted: 2018-11-28

## 2021-10-14 PROBLEM — I10 BENIGN ESSENTIAL HYPERTENSION: Status: ACTIVE | Noted: 2018-08-21

## 2021-10-14 PROBLEM — G93.32 CHRONIC FATIGUE SYNDROME: Status: ACTIVE | Noted: 2017-03-08

## 2021-10-14 PROBLEM — M25.50 MULTIPLE JOINT PAIN: Status: ACTIVE | Noted: 2021-09-14

## 2021-10-14 PROCEDURE — 1170F FXNL STATUS ASSESSED: CPT | Performed by: NURSE PRACTITIONER

## 2021-10-14 PROCEDURE — 96160 PT-FOCUSED HLTH RISK ASSMT: CPT | Performed by: NURSE PRACTITIONER

## 2021-10-14 PROCEDURE — 1160F RVW MEDS BY RX/DR IN RCRD: CPT | Performed by: NURSE PRACTITIONER

## 2021-10-14 PROCEDURE — G0439 PPPS, SUBSEQ VISIT: HCPCS | Performed by: NURSE PRACTITIONER

## 2021-10-14 NOTE — PROGRESS NOTES
The ABCs of the Annual Wellness Visit  Subsequent Medicare Wellness Visit    Chief Complaint   Patient presents with   • Anxiety   • Medicare Wellness-subsequent      Subjective    History of Present Illness:  An Harmon is a 61 y.o. female who presents for a Subsequent Medicare Wellness Visit.    The following portions of the patient's history were reviewed and   updated as appropriate: allergies, past family history, past medical history, past social history, past surgical history and problem list.    Compared to one year ago, the patient feels her physical   health is worse.    Compared to one year ago, the patient feels her mental   health is worse.    Recent Hospitalizations:  She was not admitted to the hospital during the last year.       Current Medical Providers:  Patient Care Team:  Haydee Anderson APRN as PCP - General (Nurse Practitioner)    Outpatient Medications Prior to Visit   Medication Sig Dispense Refill   • albuterol sulfate  (90 Base) MCG/ACT inhaler Inhale 2 puffs Every 6 (Six) Hours As Needed for Wheezing or Shortness of Air. 24 g 1   • Alcohol Swabs (B-D SINGLE USE SWABS REGULAR) pads 1 each Daily. 100 each 0   • amitriptyline (ELAVIL) 75 MG tablet Take 1 tablet by mouth Daily. 90 tablet 0   • aspirin 81 MG EC tablet Take 81 mg by mouth Daily.     • baclofen (LIORESAL) 20 MG tablet Take 1 tablet by mouth 4 (Four) Times a Day. 360 tablet 0   • Blood Glucose Monitoring Suppl (True Metrix Meter) w/Device kit      • busPIRone (BUSPAR) 10 MG tablet Take 1 tablet by mouth 3 (Three) Times a Day. 270 tablet 0   • desonide (DESOWEN) 0.05 % cream Apply 1 application topically to the appropriate area as directed 2 (Two) Times a Day As Needed.     • doxepin (SINEquan) 25 MG capsule Take 1 capsule by mouth Daily. 90 capsule 0   • doxepin (SINEquan) 25 MG capsule TAKE 1 CAPSULE EVERY DAY 90 capsule 0   • DULoxetine (CYMBALTA) 60 MG capsule Take 1 capsule by mouth Daily. 90 capsule 1   •  esomeprazole (nexIUM) 40 MG capsule TAKE 1 CAPSULE TWICE DAILY 180 capsule 0   • fexofenadine (Allegra Allergy) 60 MG tablet Take 1 tablet by mouth Daily. 90 tablet 0   • gabapentin (NEURONTIN) 800 MG tablet 1 TAB PO  tablet 0   • glucose blood (True Metrix Blood Glucose Test) test strip TEST BLOOD GLUCOSE  each 0   • hydrOXYzine (ATARAX) 25 MG tablet TAKE 1 TABLET BY MOUTH 3 (THREE) TIMES A DAY AS NEEDED FOR ITCHING. 30 tablet 0   • ipratropium-albuterol (DUO-NEB) 0.5-2.5 mg/3 ml nebulizer Take 3 mL by nebulization 4 (Four) Times a Day As Needed for Wheezing or Shortness of Air. 360 mL 0   • metoprolol succinate XL (TOPROL-XL) 50 MG 24 hr tablet Take 1 tablet by mouth Daily. 90 tablet 0   • mometasone (NASONEX) 50 MCG/ACT nasal spray 2 sprays into the nostril(s) as directed by provider Daily.     • montelukast (SINGULAIR) 10 MG tablet Take 1 tablet by mouth Daily. 90 tablet 0   • Mucinex 600 MG 12 hr tablet TAKE 1 TABLET BY MOUTH 2 (TWO) TIMES A DAY FOR 14 DAYS. 28 tablet 0   • nystatin (MYCOSTATIN) 291616 UNIT/GM powder APPLY TOPICALLY TO THE APPROPRIATE AREA AS DIRECTED THREE TIMES DAILY AS NEEDED FOR RASH 60 g 0   • oxyCODONE-acetaminophen (Percocet)  MG per tablet Take 1 tablet by mouth Every 6 (Six) Hours As Needed for Moderate Pain  for up to 30 days. 120 tablet 0   • polyethylene glycol (MIRALAX) powder Take 17 g by mouth Daily. MIX WITH 8 OZS OF FLUID OF CHOICE EXCEPT MILK.     • Prenatal Vit-Fe Fumarate-FA (Trinatal Rx 1) 60-1 MG tablet TAKE 1 TABLET EVERY DAY 90 tablet 1   • promethazine (PHENERGAN) 25 MG tablet Take 1 tablet by mouth 2 (Two) Times a Day As Needed for Nausea or Vomiting. 60 tablet 0   • sucralfate (CARAFATE) 1 g tablet Take 1 tablet by mouth 3 (Three) Times a Day. 270 tablet 0   • TRUEplus Lancets 33G misc 1 each Daily. 100 each 0   • umeclidinium-vilanterol (ANORO ELLIPTA) 62.5-25 MCG/INH aerosol powder  inhaler Inhale 1 puff Daily. 60 each 1     No  facility-administered medications prior to visit.       Opioid medication/s are on active medication list.  and I have evaluated her active treatment plan and pain score trends (see table).  There were no vitals filed for this visit.  I have reviewed the chart for potential of high risk medication and harmful drug interactions in the elderly.            Aspirin is on active medication list. Aspirin use is indicated based on review of current medical condition/s. Pros and cons of this therapy have been discussed today. Benefits of this medication outweigh potential harm.  Patient has been encouraged to continue taking this medication.  .      Patient Active Problem List   Diagnosis   • Morbidly obese (HCC)   • Benign neoplasm of meninges (HCC)   • Brain tumor (HCC)   • Chronic low back pain   • Chronic obstructive pulmonary disease (HCC)   • Chronic pain   • Constipation   • Cyst of thyroid   • Degeneration of intervertebral disc of cervical region   • Degeneration of intervertebral disc of lumbar region   • Degeneration of intervertebral disc of thoracic region   • Failed back syndrome   • Female bladder prolapse   • Functional gait abnormality   • Gastroesophageal reflux disease   • Glaucoma   • Hypertension   • Hypoxemia   • Kyphosis of cervical region   • Mixed anxiety depressive disorder   • Overactive bladder   • Pedal edema   • Prediabetes   • Pulmonary sarcoidosis (HCC)   • Rectocele   • Elevated rheumatoid factor   • Rheumatoid arthritis with rheumatoid factor (HCC)   • Scoliosis deformity of spine   • Asthma   • Obstructive sleep apnea syndrome   • Spinal stenosis   • Yeast dermatitis   • Benign essential hypertension   • Chronic fatigue syndrome   • Diverticulitis of colon   • Family history of rheumatoid arthritis   • Idiopathic peripheral neuropathy   • Idiopathic sleep related nonobstructive alveolar hypoventilation   • Mild persistent asthma with (acute) exacerbation   • Mixed hyperlipidemia   •  "Multiple joint pain   • Osteoporosis   • Severe recurrent major depression without psychotic features (HCC)     Advance Care Planning  Advance Directive is on file.  ACP discussion was declined by the patient. Patient has an advance directive in EMR which is still valid.     Review of Systems   All other systems reviewed and are negative.       Objective    Vitals:    10/14/21 1536   BP: 141/78   BP Location: Left arm   Patient Position: Sitting   Cuff Size: Adult   Pulse: 70   Resp: 18   Temp: 98.4 °F (36.9 °C)   TempSrc: Infrared   SpO2: 92%   Weight: 102 kg (224 lb)   Height: 160 cm (63\")     BMI Readings from Last 1 Encounters:   10/14/21 39.68 kg/m²   BMI is above normal parameters. Recommendations include: exercise counseling, nutrition counseling, pharmacological intervention, referral to a nutritionist, referral to primary care, referral to a weight management program and pain management    Does the patient have evidence of cognitive impairment? Yes    Physical Exam  Vitals and nursing note reviewed.   Constitutional:       Appearance: Normal appearance. She is well-developed.   HENT:      Head: Normocephalic and atraumatic.   Eyes:      Conjunctiva/sclera: Conjunctivae normal.      Pupils: Pupils are equal, round, and reactive to light.   Cardiovascular:      Rate and Rhythm: Normal rate and regular rhythm.      Heart sounds: Normal heart sounds.   Pulmonary:      Effort: Pulmonary effort is normal.      Breath sounds: Normal breath sounds.   Abdominal:      General: Bowel sounds are normal.      Palpations: Abdomen is soft.   Musculoskeletal:         General: Normal range of motion.      Cervical back: Normal range of motion and neck supple.   Skin:     General: Skin is warm and dry.   Neurological:      Mental Status: She is alert and oriented to person, place, and time.   Psychiatric:         Behavior: Behavior normal.         Thought Content: Thought content normal.         Judgment: Judgment normal. "       Lab Results   Component Value Date    TRIG 113 08/11/2021    HDL 71 (H) 08/11/2021     (H) 08/11/2021    VLDL 20 08/11/2021            HEALTH RISK ASSESSMENT    Smoking Status:  Social History     Tobacco Use   Smoking Status Never Smoker   Smokeless Tobacco Never Used     Alcohol Consumption:  Social History     Substance and Sexual Activity   Alcohol Use No     Fall Risk Screen:    CONI Fall Risk Assessment was completed, and patient is at HIGH risk for falls. Assessment completed on:10/14/2021    Depression Screening:  PHQ-2/PHQ-9 Depression Screening 10/14/2021   Little interest or pleasure in doing things 2   Feeling down, depressed, or hopeless 3   Trouble falling or staying asleep, or sleeping too much 3   Feeling tired or having little energy 3   Poor appetite or overeating 3   Feeling bad about yourself - or that you are a failure or have let yourself or your family down 3   Trouble concentrating on things, such as reading the newspaper or watching television 3   Moving or speaking so slowly that other people could have noticed. Or the opposite - being so fidgety or restless that you have been moving around a lot more than usual 3   Thoughts that you would be better off dead, or of hurting yourself in some way 0   Total Score 23   If you checked off any problems, how difficult have these problems made it for you to do your work, take care of things at home, or get along with other people? Extremely dIfficult       Health Habits and Functional and Cognitive Screening:  Functional & Cognitive Status 10/14/2021   Do you have difficulty preparing food and eating? Yes   Do you have difficulty bathing yourself, getting dressed or grooming yourself? Yes   Do you have difficulty using the toilet? Yes   Do you have difficulty moving around from place to place? Yes   Do you have trouble with steps or getting out of a bed or a chair? Yes   Current Diet Unhealthy Diet   Dental Exam Not up to date         Dental Exam Comment edentulous   Eye Exam Not up to date   Exercise (times per week) 0 times per week   Do you need help using the phone?  No   Are you deaf or do you have serious difficulty hearing?  No   Do you need help with transportation? No   Do you need help shopping? Yes   Do you need help preparing meals?  Yes   Do you need help with housework?  Yes   Do you need help with laundry? Yes   Do you need help taking your medications? No   Do you need help managing money? No   Do you ever drive or ride in a car without wearing a seat belt? No   Have you felt unusual stress, anger or loneliness in the last month? Yes   Who do you live with? Alone   If you need help, do you have trouble finding someone available to you? Yes   Have you been bothered in the last four weeks by sexual problems? No   Do you have difficulty concentrating, remembering or making decisions? Yes       Age-appropriate Screening Schedule:  Refer to the list below for future screening recommendations based on patient's age, sex and/or medical conditions. Orders for these recommended tests are listed in the plan section. The patient has been provided with a written plan.    Health Maintenance   Topic Date Due   • URINE MICROALBUMIN  Never done   • TDAP/TD VACCINES (1 - Tdap) Never done   • ZOSTER VACCINE (1 of 2) Never done   • DIABETIC FOOT EXAM  Never done   • PAP SMEAR  Never done   • HEMOGLOBIN A1C  Never done   • DIABETIC EYE EXAM  Never done   • DXA SCAN  03/25/2021   • MAMMOGRAM  05/21/2021   • INFLUENZA VACCINE  Never done   • LIPID PANEL  08/11/2022              Assessment/Plan   CMS Preventative Services Quick Reference  Risk Factors Identified During Encounter  Cardiovascular Disease  Chronic Pain   Polypharmacy  Urinary Incontinence  The above risks/problems have been discussed with the patient.  Follow up actions/plans if indicated are seen below in the Assessment/Plan Section.  Pertinent information has been shared with the patient  in the After Visit Summary.    Diagnoses and all orders for this visit:    1. Encounter for screening mammogram for malignant neoplasm of breast (Primary)  -     Mammo Screening Digital Tomosynthesis Bilateral With CAD    2. Traumatic injury of rib  -     XR Chest 2 View; Future  -     XR Ribs Bilateral 3 View (In Office)    3. Shortness of breath  -     Oxygen Therapy        Follow Up:   Return in about 18 days (around 11/1/2021).     An After Visit Summary and PPPS were made available to the patient.

## 2021-10-28 ENCOUNTER — TELEPHONE (OUTPATIENT)
Dept: FAMILY MEDICINE CLINIC | Facility: CLINIC | Age: 61
End: 2021-10-28

## 2021-10-28 DIAGNOSIS — M05.9 RHEUMATOID ARTHRITIS WITH POSITIVE RHEUMATOID FACTOR, INVOLVING UNSPECIFIED SITE (HCC): ICD-10-CM

## 2021-10-28 RX ORDER — OXYCODONE AND ACETAMINOPHEN 10; 325 MG/1; MG/1
1 TABLET ORAL EVERY 6 HOURS PRN
Qty: 120 TABLET | Refills: 0 | Status: SHIPPED | OUTPATIENT
Start: 2021-10-28 | End: 2021-12-02 | Stop reason: SDUPTHER

## 2021-10-28 RX ORDER — PROMETHAZINE HYDROCHLORIDE 25 MG/1
TABLET ORAL
COMMUNITY
Start: 2021-10-07 | End: 2021-11-17 | Stop reason: SDUPTHER

## 2021-10-28 NOTE — TELEPHONE ENCOUNTER
PATIENT NEEDS REFILL ON:oxyCODONE-acetaminophen (Percocet)  MG per tablet     PATIENT CAN BE REACHED ON: [phone]     PHARMACY [facility] [address] [place] [address] [place] - [phone]  - [phone]   [phone]

## 2021-10-28 NOTE — TELEPHONE ENCOUNTER
I would recommend an ER or urgent care evaluation as they can give her some IV/IM medications that can help with the pain.  Thank you,  Haydee

## 2021-10-28 NOTE — TELEPHONE ENCOUNTER
PATIENT IS CALLING STATES THAT SHE HAS A REALLY BAD MIGRAINE AND WANTS TO KNOW WHAT SHE CAN DO TO HELP IT? PLEASE ADVISE

## 2021-10-28 NOTE — TELEPHONE ENCOUNTER
Rx Refill Note  Requested Prescriptions     Pending Prescriptions Disp Refills   • oxyCODONE-acetaminophen (Percocet)  MG per tablet 120 tablet 0     Sig: Take 1 tablet by mouth Every 6 (Six) Hours As Needed for Moderate Pain  for up to 30 days.      Last office visit with prescribing clinician: 10/14/2021      Next office visit with prescribing clinician: 11/3/2021     Office Visit with Haydee Anderson APRN (10/14/2021)     POC Urine Drug Screen, Triage (08/25/2021 15:40)  Hepatitis C antibody (08/11/2021 07:56)  Comprehensive metabolic panel (08/11/2021 07:56)  TSH+Free T4 (08/11/2021 07:56)  CBC & Differential (08/11/2021 07:56)        Polly Fink CMA  10/28/21, 14:05 EDT

## 2021-10-29 NOTE — TELEPHONE ENCOUNTER
I would recommend an ER or urgent care evaluation as they can give her some IV/IM medications that can help with the pain

## 2021-11-08 RX ORDER — BACLOFEN 20 MG/1
TABLET ORAL
Qty: 360 TABLET | Refills: 0 | Status: SHIPPED | OUTPATIENT
Start: 2021-11-08

## 2021-11-12 ENCOUNTER — TELEPHONE (OUTPATIENT)
Dept: FAMILY MEDICINE CLINIC | Facility: CLINIC | Age: 61
End: 2021-11-12

## 2021-11-12 NOTE — TELEPHONE ENCOUNTER
Caller: An Harmon    Relationship: Self    Best call back number: [phone]     What orders are you requesting (i.e. lab or imaging): XRAY    In what timeframe would the patient need to come in: ASAP    Additional notes: PATIENT IS REQUESTING X RAY OF HER HANDS. PLEASE ADVISE

## 2021-11-12 NOTE — TELEPHONE ENCOUNTER
CALLED AND S/W PATIENT FOR CLARIFICATION. SHE STATES THAT IT IS HER LEFT HAND, AND HAS BEEN GOING ON FOR PAST 2 DAYS. IT STARTS AT THE INDEX FINGER AND GOES BACK TO HER PALM. SHE WAS BREAKING UP A CLUMP OF ICE IN HER HAND WITH A SMALL MALLET AND IS GUESSING SHE ACCIDENTALLY HIT HER LEFT HAND WITH IT.

## 2021-11-14 DIAGNOSIS — M79.645 PAIN IN FINGER OF LEFT HAND: Primary | ICD-10-CM

## 2021-11-17 DIAGNOSIS — F41.9 ANXIETY: ICD-10-CM

## 2021-11-17 NOTE — TELEPHONE ENCOUNTER
Rx Refill Note  Requested Prescriptions     Pending Prescriptions Disp Refills   • promethazine (PHENERGAN) 25 MG tablet     • nystatin (MYCOSTATIN) 726921 UNIT/GM powder 60 g 0   • amitriptyline (ELAVIL) 75 MG tablet 90 tablet 0     Sig: Take 1 tablet by mouth Daily.   • montelukast (SINGULAIR) 10 MG tablet 90 tablet 0     Sig: Take 1 tablet by mouth Daily.   • gabapentin (NEURONTIN) 800 MG tablet 360 tablet 0     Si TAB PO QID   • doxepin (SINEquan) 25 MG capsule 90 capsule 0     Sig: Take 1 capsule by mouth Daily.      Last office visit with prescribing clinician: 10/14/2021      Next office visit with prescribing clinician: 2021            Courtney Quintanilla Rep  21, 12:14 EST

## 2021-11-18 RX ORDER — AMITRIPTYLINE HYDROCHLORIDE 75 MG/1
75 TABLET, FILM COATED ORAL EVERY 24 HOURS
Qty: 90 TABLET | Refills: 0 | Status: SHIPPED | OUTPATIENT
Start: 2021-11-18

## 2021-11-18 RX ORDER — DULOXETIN HYDROCHLORIDE 60 MG/1
60 CAPSULE, DELAYED RELEASE ORAL DAILY
Qty: 90 CAPSULE | Refills: 1 | Status: SHIPPED | OUTPATIENT
Start: 2021-11-18

## 2021-11-18 RX ORDER — MONTELUKAST SODIUM 10 MG/1
10 TABLET ORAL DAILY
Qty: 90 TABLET | Refills: 0 | Status: SHIPPED | OUTPATIENT
Start: 2021-11-18

## 2021-11-18 RX ORDER — FEXOFENADINE HYDROCHLORIDE 60 MG/1
60 TABLET, FILM COATED ORAL DAILY
Qty: 90 TABLET | Refills: 0 | Status: SHIPPED | OUTPATIENT
Start: 2021-11-18

## 2021-11-18 RX ORDER — NYSTATIN 100000 [USP'U]/G
POWDER TOPICAL
Qty: 60 G | Refills: 0 | Status: SHIPPED | OUTPATIENT
Start: 2021-11-18 | End: 2021-11-29 | Stop reason: SDUPTHER

## 2021-11-18 RX ORDER — GABAPENTIN 800 MG/1
TABLET ORAL
Qty: 360 TABLET | Refills: 0 | Status: SHIPPED | OUTPATIENT
Start: 2021-11-18

## 2021-11-18 RX ORDER — SUCRALFATE 1 G/1
1 TABLET ORAL 3 TIMES DAILY
Qty: 270 TABLET | Refills: 0 | Status: SHIPPED | OUTPATIENT
Start: 2021-11-18

## 2021-11-18 RX ORDER — BUSPIRONE HYDROCHLORIDE 10 MG/1
10 TABLET ORAL 3 TIMES DAILY
Qty: 270 TABLET | Refills: 0 | Status: SHIPPED | OUTPATIENT
Start: 2021-11-18

## 2021-11-18 RX ORDER — DOXEPIN HYDROCHLORIDE 25 MG/1
25 CAPSULE ORAL DAILY
Qty: 90 CAPSULE | Refills: 0 | Status: SHIPPED | OUTPATIENT
Start: 2021-11-18 | End: 2021-11-29 | Stop reason: SDUPTHER

## 2021-11-18 RX ORDER — PROMETHAZINE HYDROCHLORIDE 25 MG/1
25 TABLET ORAL EVERY 8 HOURS PRN
Qty: 45 TABLET | Refills: 0 | Status: SHIPPED | OUTPATIENT
Start: 2021-11-18 | End: 2021-11-29 | Stop reason: SDUPTHER

## 2021-11-29 NOTE — TELEPHONE ENCOUNTER
Rx Refill Note  Requested Prescriptions     Pending Prescriptions Disp Refills   • promethazine (PHENERGAN) 25 MG tablet 90 tablet 0     Sig: Take 1 tablet by mouth Every 8 (Eight) Hours As Needed for Nausea or Vomiting.   • nystatin (MYCOSTATIN) 834796 UNIT/GM powder 180 g 0     Sig: Apply to affected area 3x a day as needed   • doxepin (SINEquan) 25 MG capsule 90 capsule 0     Sig: Take 1 capsule by mouth Daily.      Last office visit with prescribing clinician: 10/14/2021      Next office visit with prescribing clinician: 12/8/2021     Comprehensive metabolic panel (08/11/2021 07:56)  CBC & Differential (08/11/2021 07:56)         Shaunna Martínez LPN  11/29/21, 17:00 EST

## 2021-11-30 RX ORDER — PROMETHAZINE HYDROCHLORIDE 25 MG/1
25 TABLET ORAL EVERY 8 HOURS PRN
Qty: 90 TABLET | Refills: 0 | Status: SHIPPED | OUTPATIENT
Start: 2021-11-30

## 2021-11-30 RX ORDER — NYSTATIN 100000 [USP'U]/G
POWDER TOPICAL
Qty: 180 G | Refills: 0 | Status: SHIPPED | OUTPATIENT
Start: 2021-11-30

## 2021-11-30 RX ORDER — DOXEPIN HYDROCHLORIDE 25 MG/1
25 CAPSULE ORAL DAILY
Qty: 90 CAPSULE | Refills: 0 | Status: SHIPPED | OUTPATIENT
Start: 2021-11-30

## 2021-12-02 DIAGNOSIS — M05.9 RHEUMATOID ARTHRITIS WITH POSITIVE RHEUMATOID FACTOR, INVOLVING UNSPECIFIED SITE (HCC): ICD-10-CM

## 2021-12-02 RX ORDER — OXYCODONE AND ACETAMINOPHEN 10; 325 MG/1; MG/1
1 TABLET ORAL EVERY 6 HOURS PRN
Qty: 120 TABLET | Refills: 0 | Status: SHIPPED | OUTPATIENT
Start: 2021-12-02 | End: 2022-01-01

## 2021-12-02 NOTE — TELEPHONE ENCOUNTER
Rx Refill Note  Requested Prescriptions     Pending Prescriptions Disp Refills   • oxyCODONE-acetaminophen (Percocet)  MG per tablet 120 tablet 0     Sig: Take 1 tablet by mouth Every 6 (Six) Hours As Needed for Moderate Pain  for up to 30 days.      Last office visit with prescribing clinician: 10/14/2021      Next office visit with prescribing clinician: 12/8/2021     Office Visit with Haydee Anderson APRN (10/14/2021)  POC Urine Drug Screen, Triage (08/25/2021 15:40)  POCT urinalysis dipstick, automated (08/25/2021 15:39)  Lipid Panel (08/11/2021 07:56)  Hepatitis C antibody (08/11/2021 07:56)  Vitamin D 25 hydroxy (08/11/2021 07:56)  Comprehensive metabolic panel (08/11/2021 07:56)  TSH+Free T4 (08/11/2021 07:56)         Polly Fink CMA  12/02/21, 12:57 EST

## 2021-12-02 NOTE — TELEPHONE ENCOUNTER
Caller: An Harmon    Relationship: Self    Best call back number: [phone]     Requested Prescriptions:   Requested Prescriptions     Pending Prescriptions Disp Refills   • oxyCODONE-acetaminophen (Percocet)  MG per tablet 120 tablet 0     Sig: Take 1 tablet by mouth Every 6 (Six) Hours As Needed for Moderate Pain  for up to 30 days.        Pharmacy where request should be sent: [address] [phone] [facility] [phone]      Additional details provided by patient: SHE IS TOTALLY OUT OF THIS MEDICATION AND SHE WILL BE IN [place] TODAY AND WOULD LIKE TO PICK IT UP TODAY.    Does the patient have less than a 3 day supply:  [x] Yes  [] No    Loreta Rodríguez, PCT   12/02/21 12:07 EST

## 2021-12-06 ENCOUNTER — TELEPHONE (OUTPATIENT)
Dept: FAMILY MEDICINE CLINIC | Facility: CLINIC | Age: 61
End: 2021-12-06

## 2021-12-06 NOTE — TELEPHONE ENCOUNTER
Caller: An Harmon    Relationship: Self    Best call back number: [phone]    What is the best time to reach you: ANYTIME    Who are you requesting to speak with (clinical staff, provider,  specific staff member): CLINICAL    What was the call regarding: PATIENT HAS A COUGH, FEVER  AND COUGHING UP MUCUS. PATIENT STATES SHE HAS PNEUMONIA BUT HAS NOT BEEN SEEN YET FOR IT. PATIENT HAS NO APPETITE AND JUST FEELS SICK.     PATIENT IS REQUESTING SOME MEDICATION TO HELP HER FEEL BETTER.     PREFERRED PHARMACY: [facility] [address] [place] [address] [place] - [phone] [facility] [phone]      PLEASE CALL AND ADVISE PATIENT.

## 2021-12-07 ENCOUNTER — TELEPHONE (OUTPATIENT)
Dept: FAMILY MEDICINE CLINIC | Facility: CLINIC | Age: 61
End: 2021-12-07

## 2021-12-07 NOTE — TELEPHONE ENCOUNTER
DR. MABRY,   PLEASE ADVISE. ER RECORDS AS WELL AS HER CHEST X-RAY IS IN HER CHART. CXR DID NOT SHOW PNEUMONIA AS PATIENT ADVISES. SHE DID DECLINE MONOCLONAL INFUSION IN ER DISPOSITION @ [facility].     THANK YOU.

## 2021-12-07 NOTE — TELEPHONE ENCOUNTER
CALLED [facility]. S/W TERESA. REQUESTED ER RECORDS INCLUDING COVID RESULT. TERESA WILL BE SENDING THEM SHORTLY.

## 2021-12-08 NOTE — TELEPHONE ENCOUNTER
Hub is instructed to read the documentation below to patient  PATRICIA HAS ALREADY CALLED 2X TO PUT PATIENT ON SCHEDULE FOR A TELEHEALTH FOR TODAY. PT DID NOT ANSWER, BUT PATRICIA DID PUT PATIENT ON SCHEDULE  TODAY. DR. MABRY IS REQUESTING A TELEHEALTH VISIT WITH PATIENT TODAY TO DISCUSS HER ISSUES. PT NEEDS TO BE NOTIFIED OF APPT VIA TELEHEALTH.

## 2021-12-20 RX ORDER — ISOPROPYL ALCOHOL 0.75 G/1
SWAB TOPICAL
Qty: 100 EACH | Refills: 0 | OUTPATIENT
Start: 2021-12-20

## 2021-12-22 RX ORDER — PROMETHAZINE HYDROCHLORIDE 25 MG/1
25 TABLET ORAL EVERY 8 HOURS PRN
Qty: 90 TABLET | Refills: 0 | OUTPATIENT
Start: 2021-12-22

## 2022-05-20 NOTE — TELEPHONE ENCOUNTER
ERROR TOO SOON TO FILL                         Problem: Nutrition/Hydration-ADULT  Goal: Nutrient/Hydration intake appropriate for improving, restoring or maintaining nutritional needs  Description: Monitor and assess patient's nutrition/hydration status for malnutrition  Collaborate with interdisciplinary team and initiate plan and interventions as ordered  Monitor patient's weight and dietary intake as ordered or per policy  Utilize nutrition screening tool and intervene as necessary  Determine patient's food preferences and provide high-protein, high-caloric foods as appropriate       INTERVENTIONS:  - Monitor oral intake, urinary output, labs, and treatment plans  - Assess nutrition and hydration status and recommend course of action  - Evaluate amount of meals eaten  - Assist patient with eating if necessary   - Allow adequate time for meals  - Recommend/ encourage appropriate diets, oral nutritional supplements, and vitamin/mineral supplements  - Order, calculate, and assess calorie counts as needed  - Recommend, monitor, and adjust tube feedings and TPN/PPN based on assessed needs  - Assess need for intravenous fluids  - Provide specific nutrition/hydration education as appropriate  - Include patient/family/caregiver in decisions related to nutrition  Outcome: Progressing

## 2024-10-06 NOTE — TELEPHONE ENCOUNTER
Baclofen  Gabapentin  Metoprolol  Montelukast    [facility] mail order pharm    Seen 8/7/19  Labs ordered- not done yet  sched 8/27/19    
not applicable